# Patient Record
Sex: MALE | Race: WHITE | NOT HISPANIC OR LATINO
[De-identification: names, ages, dates, MRNs, and addresses within clinical notes are randomized per-mention and may not be internally consistent; named-entity substitution may affect disease eponyms.]

---

## 2017-03-18 ENCOUNTER — APPOINTMENT (OUTPATIENT)
Dept: ELECTROPHYSIOLOGY | Facility: CLINIC | Age: 57
End: 2017-03-18

## 2017-03-18 VITALS
BODY MASS INDEX: 31.81 KG/M2 | HEIGHT: 73 IN | WEIGHT: 240 LBS | OXYGEN SATURATION: 97 % | HEART RATE: 79 BPM | SYSTOLIC BLOOD PRESSURE: 155 MMHG | DIASTOLIC BLOOD PRESSURE: 87 MMHG

## 2017-06-09 ENCOUNTER — APPOINTMENT (OUTPATIENT)
Dept: ELECTROPHYSIOLOGY | Facility: CLINIC | Age: 57
End: 2017-06-09

## 2017-06-09 VITALS
SYSTOLIC BLOOD PRESSURE: 115 MMHG | OXYGEN SATURATION: 95 % | HEIGHT: 73 IN | HEART RATE: 68 BPM | DIASTOLIC BLOOD PRESSURE: 77 MMHG | WEIGHT: 230 LBS | BODY MASS INDEX: 30.48 KG/M2

## 2017-06-19 ENCOUNTER — APPOINTMENT (OUTPATIENT)
Dept: ELECTROPHYSIOLOGY | Facility: CLINIC | Age: 57
End: 2017-06-19

## 2017-07-26 ENCOUNTER — OUTPATIENT (OUTPATIENT)
Dept: OUTPATIENT SERVICES | Facility: HOSPITAL | Age: 57
LOS: 1 days | Discharge: HOME | End: 2017-07-26
Payer: MEDICARE

## 2017-07-26 ENCOUNTER — TRANSCRIPTION ENCOUNTER (OUTPATIENT)
Age: 57
End: 2017-07-26

## 2017-07-26 VITALS
TEMPERATURE: 98 F | DIASTOLIC BLOOD PRESSURE: 86 MMHG | SYSTOLIC BLOOD PRESSURE: 146 MMHG | RESPIRATION RATE: 16 BRPM | OXYGEN SATURATION: 98 % | HEART RATE: 656 BPM

## 2017-07-26 VITALS
SYSTOLIC BLOOD PRESSURE: 128 MMHG | DIASTOLIC BLOOD PRESSURE: 78 MMHG | OXYGEN SATURATION: 95 % | HEART RATE: 62 BPM | RESPIRATION RATE: 17 BRPM

## 2017-07-26 DIAGNOSIS — Z90.49 ACQUIRED ABSENCE OF OTHER SPECIFIED PARTS OF DIGESTIVE TRACT: Chronic | ICD-10-CM

## 2017-07-26 DIAGNOSIS — Z95.810 PRESENCE OF AUTOMATIC (IMPLANTABLE) CARDIAC DEFIBRILLATOR: Chronic | ICD-10-CM

## 2017-07-26 DIAGNOSIS — R07.1 CHEST PAIN ON BREATHING: ICD-10-CM

## 2017-07-26 LAB
ANION GAP SERPL CALC-SCNC: 11 MMOL/L — SIGNIFICANT CHANGE UP (ref 5–17)
APTT BLD: 33.1 SEC — SIGNIFICANT CHANGE UP (ref 27.5–37.4)
BUN SERPL-MCNC: 18 MG/DL — SIGNIFICANT CHANGE UP (ref 8–20)
CALCIUM SERPL-MCNC: 9.1 MG/DL — SIGNIFICANT CHANGE UP (ref 8.6–10.2)
CHLORIDE SERPL-SCNC: 102 MMOL/L — SIGNIFICANT CHANGE UP (ref 98–107)
CO2 SERPL-SCNC: 27 MMOL/L — SIGNIFICANT CHANGE UP (ref 22–29)
CREAT SERPL-MCNC: 0.97 MG/DL — SIGNIFICANT CHANGE UP (ref 0.5–1.3)
GLUCOSE SERPL-MCNC: 85 MG/DL — SIGNIFICANT CHANGE UP (ref 70–115)
HCT VFR BLD CALC: 38.7 % — LOW (ref 42–52)
HGB BLD-MCNC: 12.9 G/DL — LOW (ref 14–18)
INR BLD: 1.09 RATIO — SIGNIFICANT CHANGE UP (ref 0.88–1.16)
MCHC RBC-ENTMCNC: 20 PG — LOW (ref 27–31)
MCHC RBC-ENTMCNC: 33.3 G/DL — SIGNIFICANT CHANGE UP (ref 32–36)
MCV RBC AUTO: 60.1 FL — LOW (ref 80–94)
PLATELET # BLD AUTO: 129 K/UL — LOW (ref 150–400)
POTASSIUM SERPL-MCNC: 4.5 MMOL/L — SIGNIFICANT CHANGE UP (ref 3.5–5.3)
POTASSIUM SERPL-SCNC: 4.5 MMOL/L — SIGNIFICANT CHANGE UP (ref 3.5–5.3)
PROTHROM AB SERPL-ACNC: 12 SEC — SIGNIFICANT CHANGE UP (ref 9.8–12.7)
RBC # BLD: 6.44 M/UL — HIGH (ref 4.6–6.2)
RBC # FLD: 17.1 % — HIGH (ref 11–15.6)
SODIUM SERPL-SCNC: 140 MMOL/L — SIGNIFICANT CHANGE UP (ref 135–145)
WBC # BLD: 7.7 K/UL — SIGNIFICANT CHANGE UP (ref 4.8–10.8)
WBC # FLD AUTO: 7.7 K/UL — SIGNIFICANT CHANGE UP (ref 4.8–10.8)

## 2017-07-26 PROCEDURE — 36415 COLL VENOUS BLD VENIPUNCTURE: CPT

## 2017-07-26 PROCEDURE — 85027 COMPLETE CBC AUTOMATED: CPT

## 2017-07-26 PROCEDURE — 85610 PROTHROMBIN TIME: CPT

## 2017-07-26 PROCEDURE — C1769: CPT

## 2017-07-26 PROCEDURE — 85730 THROMBOPLASTIN TIME PARTIAL: CPT

## 2017-07-26 PROCEDURE — 33263 RMVL & RPLCMT DFB GEN 2 LEAD: CPT

## 2017-07-26 PROCEDURE — 93458 L HRT ARTERY/VENTRICLE ANGIO: CPT

## 2017-07-26 PROCEDURE — 80048 BASIC METABOLIC PNL TOTAL CA: CPT

## 2017-07-26 PROCEDURE — C1894: CPT

## 2017-07-26 PROCEDURE — C1887: CPT

## 2017-07-26 PROCEDURE — 93010 ELECTROCARDIOGRAM REPORT: CPT

## 2017-07-26 PROCEDURE — 93005 ELECTROCARDIOGRAM TRACING: CPT

## 2017-07-26 PROCEDURE — 93458 L HRT ARTERY/VENTRICLE ANGIO: CPT | Mod: 26

## 2017-07-26 RX ORDER — CEPHALEXIN 500 MG
1 CAPSULE ORAL
Qty: 0 | Refills: 0 | DISCHARGE
Start: 2017-07-26

## 2017-07-26 RX ORDER — COLCHICINE 0.6 MG
0.6 TABLET ORAL
Qty: 0 | Refills: 0 | Status: DISCONTINUED | OUTPATIENT
Start: 2017-07-26 | End: 2017-08-10

## 2017-07-26 RX ORDER — CEPHALEXIN 500 MG
500 CAPSULE ORAL ONCE
Qty: 0 | Refills: 0 | Status: COMPLETED | OUTPATIENT
Start: 2017-07-26 | End: 2017-07-26

## 2017-07-26 RX ORDER — COLCHICINE 0.6 MG
1 TABLET ORAL
Qty: 180 | Refills: 0 | OUTPATIENT
Start: 2017-07-26

## 2017-07-26 RX ADMIN — Medication 500 MILLIGRAM(S): at 21:03

## 2017-07-26 NOTE — DISCHARGE NOTE ADULT - CARE PLAN
Principal Discharge DX:	Pericarditis  Goal:	Optimal cardiac function  Instructions for follow-up, activity and diet:	Restricted use with no heavy lifting of affected arm for 48 hours.  No submerging the arm in water for 48 hours.    Call your doctor for any bleeding, swelling, loss of sensation in the hand or fingers, or fingers turning blue.  If heavy bleeding or large lumps form, hold pressure at the spot and come to the Emergency Room. Principal Discharge DX:	Pericarditis  Goal:	Optimal cardiac function  Instructions for follow-up, activity and diet:	Restricted use with no heavy lifting of affected arm for 48 hours.  No submerging the arm in water for 48 hours.    Call your doctor for any bleeding, swelling, loss of sensation in the hand or fingers, or fingers turning blue.  If heavy bleeding or large lumps form, hold pressure at the spot and come to the Emergency Room.  Instructions for follow-up, activity and diet:	Fever, chills, or redness in left chest wall site call doctor immediately.

## 2017-07-26 NOTE — DISCHARGE NOTE ADULT - HOSPITAL COURSE
S/P [facility] no intervention via right radial artery. Right wrist site benign. S/P [facility] no intervention via right radial artery. Right wrist site benign. S/P ICD generator change. Left chest wall benign. Patient awake and alert without complaints./ Denies chest pain, sob, palps    Echo 7.7.16 - LVEF 60-65%, mild dilatation of ascending aorta, trace MR (26 Jul 2017 14:43)    PAST MEDICAL & SURGICAL HISTORY:  Crohns disease  Pericarditis  Enlarged prostate  Ventricular tachycardia  Hypertension  Cardiomyopathy  Atrial fibrillation  Aortic aneurysm  Paroxysmal atrial fibrillation  Pacemaker  Mediterranean anemia  COPD (chronic obstructive pulmonary disease)  Bronchitis  Asthma  AICD (automatic cardioverter/defibrillator) present  History of cholecystectomy  History of appendectomy      Gen: Awake, alert, note acute distress  Neuro: A&OX3  Chest: CTA, S1, S2, no murmur, RRR, left chest site dry, intact, no edema  Abd: Soft  Extremity: No edema    PLAN:  No heavy lifting x 1 week  May shower tomorrow  Bedrest x 1 hour  Keflex 500mg po x 1 tonight  Hold eliquis until saturday  Discharge at 2100 if awake and alert   Follow up with Dr. Mariscal in 1 week for wound check S/P [facility] no intervention via right radial artery. Right wrist site benign. S/P ICD generator change. Left chest wall benign. Patient awake and alert without complaints./ Denies chest pain, sob, palps    Echo 7.7.16 - LVEF 60-65%, mild dilatation of ascending aorta, trace MR (26 Jul 2017 14:43)    PAST MEDICAL & SURGICAL HISTORY:  Crohns disease  Pericarditis  Enlarged prostate  Ventricular tachycardia  Hypertension  Cardiomyopathy  Atrial fibrillation  Aortic aneurysm  Paroxysmal atrial fibrillation  Pacemaker  Mediterranean anemia  COPD (chronic obstructive pulmonary disease)  Bronchitis  Asthma  AICD (automatic cardioverter/defibrillator) present  History of cholecystectomy  History of appendectomy      Gen: Awake, alert, note acute distress  Neuro: A&OX3  Chest: CTA, S1, S2, no murmur, RRR, left chest site dry, intact, no edema  Abd: Soft  Extremity: No edema    PLAN:  Wrist management discussed with patient  Signs and symptoms of infection discussed with patient at chest site  No heavy lifting x 1 week  May shower tomorrow  Bedrest x 1 hour  Keflex 500mg po x 1 tonight  Hold eliquis until saturday  Discharge at 2100 if awake and alert   Follow up with Dr. Mariscal in 1 week for wound check

## 2017-07-26 NOTE — DISCHARGE NOTE ADULT - CARE PROVIDERS DIRECT ADDRESSES
[email or web address]

## 2017-07-26 NOTE — H&P PST ADULT - HISTORY OF PRESENT ILLNESS
57 y/o male with c/p right sided dull chest pain with exertion associated with left arm pain over the past 1 week, that is relieved with rest. Pt has a history of pericarditis in the past and reports his recent symptoms are unfamiliar. Last cath was 8 years ago warranting no intervention at the time. PMH includes dilated cardiomyopathy s/p AICD implant, PAF, HTN, HLD 55 y/o male with c/p right sided dull chest pain with exertion associated with left arm pain over the past 1 week, that is relieved with rest. Pt has a history of pericarditis in the past and reports his recent symptoms are unfamiliar. Last cath was 8 years ago warranting no intervention at the time. PMH includes dilated cardiomyopathy s/p AICD implant, PAF s/p cryoablation 11/2016, HTN, HLD. 57 y/o male with c/o right sided dull chest pain with exertion associated with left arm pain over the past 1 week, that is relieved with rest. Pt has a history of pericarditis in the past and reports his recent symptoms are relatively unfamiliar. Last cath was 8 years ago warranting no intervention at the time. PMH includes dilated cardiomyopathy s/p AICD implant, PAF s/p cryoablation 11/2016, HTN, HLD.     Echo 7.7.16 - LVEF 60-65%, mild dilatation of ascending aorta, trace MR

## 2017-07-26 NOTE — ASU PATIENT PROFILE, ADULT - PSH
AICD (automatic cardioverter/defibrillator) present    History of appendectomy    History of cholecystectomy

## 2017-07-26 NOTE — ASU PATIENT PROFILE, ADULT - PMH
Aortic aneurysm    Asthma    Atrial fibrillation    Bronchitis    Cardiomyopathy    COPD (chronic obstructive pulmonary disease)    Crohns disease    Enlarged prostate    Hypertension    Mediterranean anemia    Pacemaker    Paroxysmal atrial fibrillation    Pericarditis    Ventricular tachycardia

## 2017-07-26 NOTE — DISCHARGE NOTE ADULT - PLAN OF CARE
Optimal cardiac function Restricted use with no heavy lifting of affected arm for 48 hours.  No submerging the arm in water for 48 hours.    Call your doctor for any bleeding, swelling, loss of sensation in the hand or fingers, or fingers turning blue.  If heavy bleeding or large lumps form, hold pressure at the spot and come to the Emergency Room. Fever, chills, or redness in left chest wall site call doctor immediately.

## 2017-07-26 NOTE — DISCHARGE NOTE ADULT - MEDICATION SUMMARY - MEDICATIONS TO TAKE
I will START or STAY ON the medications listed below when I get home from the hospital:    Pentasa  -- 1000 milligram(s) by mouth 4 times a day  -- Indication: For stomach    valsartan 160 mg oral capsule  --  by mouth 2 times a day  -- Indication: For bloodp ressure    Flomax 0.4 mg oral capsule  -- 1 cap(s) by mouth once a day  -- Indication: For Prostate    Eliquis 5 mg oral tablet  -- 1 tab(s) by mouth 2 times a day  -- Indication: For blood thinner; resume on saturday    Lipitor 10 mg oral tablet  -- 1 tab(s) by mouth once a day  -- Indication: For Cholesterol    Toprol-XL 25 mg oral tablet, extended release  --  by mouth 2 times a day  -- Indication: For blood pressure    Symbicort 160 mcg-4.5 mcg/inh inhalation aerosol  -- 2 puff(s) inhaled 2 times a day  -- Indication: For lungs    cephalexin 500 mg oral capsule  -- 1 cap(s) by mouth once  -- Indication: For anti biotic     hydroCHLOROthiazide 12.5 mg oral tablet  -- 1 tab(s) by mouth , As Needed  -- Indication: For diuretic    raNITIdine 150 mg oral capsule  --  by mouth once a day (at bedtime)  -- Indication: For stomac    montelukast 10 mg oral tablet  -- 1 tab(s) by mouth once a day  -- Indication: For lung    Lovaza 1000 mg oral capsule  -- 4 tab(s) by mouth once a day  -- Indication: For Cholesterol I will START or STAY ON the medications listed below when I get home from the hospital:    Pentasa  -- 1000 milligram(s) by mouth 4 times a day  -- Indication: For stomach    valsartan 160 mg oral capsule  --  by mouth 2 times a day  -- Indication: For bloodp ressure    Flomax 0.4 mg oral capsule  -- 1 cap(s) by mouth once a day  -- Indication: For Prostate    Eliquis 5 mg oral tablet  -- 1 tab(s) by mouth 2 times a day  -- Indication: For blood thinner; resume on saturday    colchicine 0.6 mg oral tablet  -- 1 tab(s) by mouth 2 times a day  -- Indication: For heart    Lipitor 10 mg oral tablet  -- 1 tab(s) by mouth once a day  -- Indication: For Cholesterol    Toprol-XL 25 mg oral tablet, extended release  --  by mouth 2 times a day  -- Indication: For blood pressure    Symbicort 160 mcg-4.5 mcg/inh inhalation aerosol  -- 2 puff(s) inhaled 2 times a day  -- Indication: For lungs    cephalexin 500 mg oral capsule  -- 1 cap(s) by mouth once  -- Indication: For anti biotic     hydroCHLOROthiazide 12.5 mg oral tablet  -- 1 tab(s) by mouth , As Needed  -- Indication: For diuretic    raNITIdine 150 mg oral capsule  --  by mouth once a day (at bedtime)  -- Indication: For stomac    montelukast 10 mg oral tablet  -- 1 tab(s) by mouth once a day  -- Indication: For lung    Lovaza 1000 mg oral capsule  -- 4 tab(s) by mouth once a day  -- Indication: For Cholesterol

## 2017-07-26 NOTE — DISCHARGE NOTE ADULT - PATIENT PORTAL LINK FT
“You can access the Follow[facility] Patient Portal, offered by [facility], by registering with the following website: [email or web address]”

## 2017-07-26 NOTE — DISCHARGE NOTE ADULT - NS AS ACTIVITY OBS
Walking-Outdoors allowed/Walking-Indoors allowed/Do not drive or operate machinery/No Heavy lifting/straining

## 2017-07-26 NOTE — DISCHARGE NOTE ADULT - ADDITIONAL INSTRUCTIONS
Follow up with Dr. Sands in 1 week for wound check Follow up with Dr. Mariscal in 1 week for wound check

## 2017-07-26 NOTE — H&P PST ADULT - ASSESSMENT
CCS 3   ·	[facility] today w/Dr Morton  ·	aspirin 81 mg x1    AICD at [facility]  ·	possible battery change today if no angioplasty performed today

## 2017-07-26 NOTE — DISCHARGE NOTE ADULT - CARE PROVIDER_API CALL
Wilver Sands), Cardiology; Internal Medicine  [address] [place] [id number]  Phone: [phone]  Fax: [phone] Claudio Mariscal (MD), Cardiac Electrophysiology; Cardiovascular Disease; Internal Medicine  [address] [place] [address]  Phone: [phone]  Fax: [phone]

## 2017-07-27 NOTE — CHART NOTE - NSCHARTNOTEFT_GEN_A_CORE
[address] [address]  Electrophysiology Lab  Dual Chamber Defibrillator Pulse Generator Change Report    Patient Information    Patient Name		Pietro Giles  Study Date		2017  Med Rec #		[id number]  			1960  Age			56yrs  Gender			Male  Referring		Adonis Hector MD  Electrophysiologist	Claudio Mariscal MD    Patient History  Pietro Giles has a Medtronic Dual Chamber ICD at [facility] and is here to undergo ICD Pulse Generator Change.    Indication:	Chronic Systolic Heart Failure (I50.22)    Procedure:  	Dual Chamber ICD Pulse Generator Change  Anesthesia:		1% Lidocaine and moderate sedation (see anesthesiologist’s note)  Methods:    	The patient was brought to the EP Lab in a fasting state.    Pre-op Antibiotics were administered (Ancef 2gm IV).  The left chest was prepped with chlorhexidine solution and draped in a sterile fashion.   The prior pocket was opened and with a blunt dissection and electrocautery the incision was opened to the level of the pulse generator capsule and the capsule was then opened and the prior existing pulse generator was removed from the capsule.  The leads were noted to be functioning adequately.  The pocket was irrigated with copious amounts of antibiotic solution and the leads were connected to the new pulse generator and the pulse generator was then placed in the pocket. The pocket was closed with a layer of 2.0 Vicryl followed by a running layer of 3.0 Vicryl followed by 4.0 Monocryl and then Dermabond to seal the skin.  The patient was brought to the recovery area in stable condition.  	  Device Information:        Pulse Generator –Medtronic  [id number] / Serial #[id number]       Leads - Right Atrial – Medtronic 5076-52cm / Serial #[id number]  (2005)                  Right Ventricular –  Medtronic – 6944-65cm /  Serial#[id number]  (2005)    Measured Data:      Right Atrial -  Threshold 0.75 V@0.4ms / Sensing 3.1 mV / Impedance 342 ohms      Right Ventricular -  Threshold  1.25V@0.4ms / 6.0 mV R waves / Impedance 589 ohms         Settings:       Bradycardia:   MVP      Tachycardia:  	   VF Zone 2400 bpm    Summary:                     Successful Dual Chamber ICD Pulse Generator Change      Recommendations:  •	Post op Antibiotic ordered    •	Follow-up wound and device check in 1-2 weeks             Claudio Mariscal MD, FHRS, [facility]   ABIM Certification in Cardiac EP / Cardiovascular Disease & Internal Medicine  [facility]

## 2017-07-30 RX ORDER — APIXABAN 2.5 MG/1
1 TABLET, FILM COATED ORAL
Qty: 0 | Refills: 0 | DISCHARGE
Start: 2017-07-30

## 2017-08-02 DIAGNOSIS — R07.89 OTHER CHEST PAIN: ICD-10-CM

## 2017-08-08 ENCOUNTER — APPOINTMENT (OUTPATIENT)
Dept: ELECTROPHYSIOLOGY | Facility: CLINIC | Age: 57
End: 2017-08-08
Payer: MEDICARE

## 2017-08-08 VITALS
HEIGHT: 73 IN | DIASTOLIC BLOOD PRESSURE: 84 MMHG | SYSTOLIC BLOOD PRESSURE: 162 MMHG | HEART RATE: 87 BPM | WEIGHT: 240 LBS | OXYGEN SATURATION: 94 % | BODY MASS INDEX: 31.81 KG/M2

## 2017-08-08 PROCEDURE — 93283 PRGRMG EVAL IMPLANTABLE DFB: CPT

## 2017-08-08 PROCEDURE — 99024 POSTOP FOLLOW-UP VISIT: CPT

## 2017-09-11 ENCOUNTER — APPOINTMENT (OUTPATIENT)
Dept: ELECTROPHYSIOLOGY | Facility: CLINIC | Age: 57
End: 2017-09-11

## 2017-11-14 ENCOUNTER — APPOINTMENT (OUTPATIENT)
Dept: ELECTROPHYSIOLOGY | Facility: CLINIC | Age: 57
End: 2017-11-14
Payer: MEDICARE

## 2017-11-14 VITALS
WEIGHT: 240 LBS | SYSTOLIC BLOOD PRESSURE: 128 MMHG | BODY MASS INDEX: 30.8 KG/M2 | HEIGHT: 74 IN | HEART RATE: 72 BPM | DIASTOLIC BLOOD PRESSURE: 88 MMHG

## 2017-11-14 PROCEDURE — 93283 PRGRMG EVAL IMPLANTABLE DFB: CPT

## 2017-11-14 PROCEDURE — 99213 OFFICE O/P EST LOW 20 MIN: CPT

## 2017-12-18 ENCOUNTER — RX RENEWAL (OUTPATIENT)
Age: 57
End: 2017-12-18

## 2018-01-22 NOTE — ASU PATIENT PROFILE, ADULT - TEACHING/LEARNING LEARNING PREFERENCES
RN NOTES

SEEN AND EVALUATED BY DR HARRIS WITH NEW ORDERS MADE. NOTED AND CARRIED OUT. individual instruction

## 2018-02-02 ENCOUNTER — MEDICATION RENEWAL (OUTPATIENT)
Age: 58
End: 2018-02-02

## 2018-03-02 ENCOUNTER — RX RENEWAL (OUTPATIENT)
Age: 58
End: 2018-03-02

## 2018-03-08 ENCOUNTER — MEDICATION RENEWAL (OUTPATIENT)
Age: 58
End: 2018-03-08

## 2018-03-12 ENCOUNTER — APPOINTMENT (OUTPATIENT)
Dept: ELECTROPHYSIOLOGY | Facility: CLINIC | Age: 58
End: 2018-03-12

## 2018-03-15 ENCOUNTER — RX RENEWAL (OUTPATIENT)
Age: 58
End: 2018-03-15

## 2018-04-02 ENCOUNTER — APPOINTMENT (OUTPATIENT)
Dept: ELECTROPHYSIOLOGY | Facility: CLINIC | Age: 58
End: 2018-04-02
Payer: MEDICARE

## 2018-04-02 PROCEDURE — 93294 REM INTERROG EVL PM/LDLS PM: CPT

## 2018-04-02 PROCEDURE — 93296 REM INTERROG EVL PM/IDS: CPT

## 2018-04-13 ENCOUNTER — MEDICATION RENEWAL (OUTPATIENT)
Age: 58
End: 2018-04-13

## 2018-04-13 ENCOUNTER — RECORD ABSTRACTING (OUTPATIENT)
Age: 58
End: 2018-04-13

## 2018-04-13 DIAGNOSIS — K21.9 GASTRO-ESOPHAGEAL REFLUX DISEASE W/OUT ESOPHAGITIS: ICD-10-CM

## 2018-04-13 RX ORDER — CEPHALEXIN 250 MG/1
250 CAPSULE ORAL
Qty: 14 | Refills: 0 | Status: COMPLETED | COMMUNITY
Start: 2017-04-03

## 2018-04-13 RX ORDER — NEOMYCIN SULFATE, POLYMYXIN B SULFATE AND DEXAMETHASONE 3.5; 10000; 1 MG/ML; [USP'U]/ML; MG/ML
3.5-10000-0.1 SUSPENSION OPHTHALMIC
Qty: 5 | Refills: 0 | Status: COMPLETED | COMMUNITY
Start: 2017-05-15

## 2018-04-16 ENCOUNTER — RX RENEWAL (OUTPATIENT)
Age: 58
End: 2018-04-16

## 2018-04-17 ENCOUNTER — APPOINTMENT (OUTPATIENT)
Dept: ELECTROPHYSIOLOGY | Facility: CLINIC | Age: 58
End: 2018-04-17
Payer: MEDICARE

## 2018-04-17 ENCOUNTER — RX RENEWAL (OUTPATIENT)
Age: 58
End: 2018-04-17

## 2018-04-17 ENCOUNTER — APPOINTMENT (OUTPATIENT)
Dept: CARDIOLOGY | Facility: CLINIC | Age: 58
End: 2018-04-17
Payer: MEDICARE

## 2018-04-17 ENCOUNTER — NON-APPOINTMENT (OUTPATIENT)
Age: 58
End: 2018-04-17

## 2018-04-17 VITALS
SYSTOLIC BLOOD PRESSURE: 144 MMHG | RESPIRATION RATE: 16 BRPM | DIASTOLIC BLOOD PRESSURE: 86 MMHG | BODY MASS INDEX: 30.8 KG/M2 | WEIGHT: 240 LBS | HEIGHT: 74 IN

## 2018-04-17 VITALS
BODY MASS INDEX: 30.8 KG/M2 | HEART RATE: 66 BPM | DIASTOLIC BLOOD PRESSURE: 86 MMHG | WEIGHT: 240 LBS | HEIGHT: 74 IN | SYSTOLIC BLOOD PRESSURE: 144 MMHG

## 2018-04-17 PROCEDURE — 93283 PRGRMG EVAL IMPLANTABLE DFB: CPT

## 2018-04-17 PROCEDURE — 99214 OFFICE O/P EST MOD 30 MIN: CPT

## 2018-04-27 ENCOUNTER — RX RENEWAL (OUTPATIENT)
Age: 58
End: 2018-04-27

## 2018-05-01 ENCOUNTER — RX RENEWAL (OUTPATIENT)
Age: 58
End: 2018-05-01

## 2018-05-02 ENCOUNTER — RX RENEWAL (OUTPATIENT)
Age: 58
End: 2018-05-02

## 2018-06-21 ENCOUNTER — MEDICATION RENEWAL (OUTPATIENT)
Age: 58
End: 2018-06-21

## 2018-07-20 ENCOUNTER — APPOINTMENT (OUTPATIENT)
Dept: ELECTROPHYSIOLOGY | Facility: CLINIC | Age: 58
End: 2018-07-20
Payer: MEDICARE

## 2018-07-20 VITALS
WEIGHT: 238 LBS | HEART RATE: 77 BPM | HEIGHT: 74 IN | DIASTOLIC BLOOD PRESSURE: 60 MMHG | BODY MASS INDEX: 30.54 KG/M2 | SYSTOLIC BLOOD PRESSURE: 94 MMHG

## 2018-07-20 PROCEDURE — 93283 PRGRMG EVAL IMPLANTABLE DFB: CPT

## 2018-07-20 PROCEDURE — 99214 OFFICE O/P EST MOD 30 MIN: CPT

## 2018-07-20 RX ORDER — COLCHICINE 0.6 MG/1
0.6 TABLET ORAL
Qty: 180 | Refills: 1 | Status: DISCONTINUED | COMMUNITY
Start: 2018-05-01 | End: 2018-07-20

## 2018-07-20 RX ORDER — VALSARTAN 160 MG/1
160 TABLET, COATED ORAL
Qty: 180 | Refills: 3 | Status: DISCONTINUED | COMMUNITY
Start: 2018-03-15 | End: 2018-07-20

## 2018-07-26 ENCOUNTER — APPOINTMENT (OUTPATIENT)
Dept: CARDIOLOGY | Facility: CLINIC | Age: 58
End: 2018-07-26
Payer: MEDICARE

## 2018-07-26 VITALS
SYSTOLIC BLOOD PRESSURE: 140 MMHG | HEIGHT: 74 IN | BODY MASS INDEX: 30.16 KG/M2 | DIASTOLIC BLOOD PRESSURE: 88 MMHG | HEART RATE: 69 BPM | WEIGHT: 235 LBS | RESPIRATION RATE: 14 BRPM

## 2018-07-26 PROCEDURE — 99214 OFFICE O/P EST MOD 30 MIN: CPT

## 2018-07-26 RX ORDER — LOSARTAN POTASSIUM 100 MG/1
100 TABLET, FILM COATED ORAL DAILY
Qty: 90 | Refills: 3 | Status: DISCONTINUED | COMMUNITY
Start: 2018-07-20 | End: 2018-07-26

## 2018-08-01 ENCOUNTER — APPOINTMENT (OUTPATIENT)
Dept: CARDIOLOGY | Facility: CLINIC | Age: 58
End: 2018-08-01
Payer: MEDICARE

## 2018-08-01 PROCEDURE — 93306 TTE W/DOPPLER COMPLETE: CPT

## 2018-08-17 ENCOUNTER — APPOINTMENT (OUTPATIENT)
Dept: CARDIOLOGY | Facility: CLINIC | Age: 58
End: 2018-08-17
Payer: MEDICARE

## 2018-08-17 VITALS
HEART RATE: 84 BPM | BODY MASS INDEX: 30.8 KG/M2 | DIASTOLIC BLOOD PRESSURE: 82 MMHG | RESPIRATION RATE: 14 BRPM | SYSTOLIC BLOOD PRESSURE: 139 MMHG | HEIGHT: 74 IN | WEIGHT: 240 LBS

## 2018-08-17 PROCEDURE — 99214 OFFICE O/P EST MOD 30 MIN: CPT

## 2018-08-17 PROCEDURE — 93000 ELECTROCARDIOGRAM COMPLETE: CPT

## 2018-08-24 ENCOUNTER — RX RENEWAL (OUTPATIENT)
Age: 58
End: 2018-08-24

## 2018-08-29 ENCOUNTER — APPOINTMENT (OUTPATIENT)
Dept: ELECTROPHYSIOLOGY | Facility: CLINIC | Age: 58
End: 2018-08-29
Payer: MEDICARE

## 2018-08-29 PROCEDURE — 93295 DEV INTERROG REMOTE 1/2/MLT: CPT

## 2018-08-29 PROCEDURE — 93296 REM INTERROG EVL PM/IDS: CPT

## 2018-10-08 ENCOUNTER — RX RENEWAL (OUTPATIENT)
Age: 58
End: 2018-10-08

## 2018-11-27 ENCOUNTER — RX RENEWAL (OUTPATIENT)
Age: 58
End: 2018-11-27

## 2018-11-27 ENCOUNTER — APPOINTMENT (OUTPATIENT)
Dept: CARDIOLOGY | Facility: CLINIC | Age: 58
End: 2018-11-27
Payer: MEDICARE

## 2018-11-27 VITALS
HEIGHT: 73 IN | SYSTOLIC BLOOD PRESSURE: 120 MMHG | HEART RATE: 79 BPM | WEIGHT: 221 LBS | RESPIRATION RATE: 14 BRPM | DIASTOLIC BLOOD PRESSURE: 80 MMHG | BODY MASS INDEX: 29.29 KG/M2

## 2018-11-27 PROCEDURE — 93000 ELECTROCARDIOGRAM COMPLETE: CPT

## 2018-11-27 PROCEDURE — 99214 OFFICE O/P EST MOD 30 MIN: CPT

## 2018-11-27 RX ORDER — AMLODIPINE BESYLATE 5 MG/1
5 TABLET ORAL DAILY
Qty: 90 | Refills: 3 | Status: DISCONTINUED | COMMUNITY
Start: 2018-04-17 | End: 2018-11-27

## 2018-11-27 NOTE — REVIEW OF SYSTEMS
[Recent Weight Loss (___ Lbs)] : recent [unfilled] ~Ulb weight loss [see HPI] : see HPI [Negative] : Heme/Lymph

## 2018-11-28 NOTE — REASON FOR VISIT
[FreeTextEntry1] : Mr. Giles presents today for the evaluation and management of his dilated cardiomyopathy, paroxysmal atrial fibrillation, hypertension, and hyperlipidemia.   \par   \par

## 2018-11-28 NOTE — ASSESSMENT
[FreeTextEntry1] : 1.  EKG today reveals sinus rhythm at 79 bpm.  Normal intervals.  No evidence of ischemia.  \par 2.  Hypertension:  Blood pressure running low on home blood pressure monitoring with systolic anywhere from 100-120, diastolics in the 60s.  At this time, I have advised the patient to discontinue Amlodipine given the low blood pressures and his recent complaints of orthostatic lightheadedness.  I suspect all of this is secondary to significant weight loss. \par 3.  Paroxysmal atrial fibrillation:  Patient remains in sinus rhythm at this time.  \par 4.  Dilated cardiomyopathy status-post AICD implantation:  Patient remains clinically stable denying chest pain or shortness of breath.  I have advised him to undergo AICD interrogation prior to traveling to [place] for the winter.  From a cardiac standpoint, we will continue all current medications.  If stable from a cardiac standpoint, office visit upon return. \par 5.  Hyperlipidemia:  Recent lipid profile reveals a total cholesterol of 123, HDL 37, LDL 74, triglycerides 58.  Patient advised to continue Atorvastatin therapy as well as follow a low-fat / low-cholesterol diet.    \par

## 2018-11-28 NOTE — HISTORY OF PRESENT ILLNESS
[FreeTextEntry1] : Mr. Giles presents today without complaints of exertional chest pain, shortness of breath, palpitations, or syncope.  He does admit to some lightheadedness particularly when standing up quickly.  This is likely orthostatic secondary to low blood pressure from recent significant weight loss (30 pounds).

## 2018-11-28 NOTE — PHYSICAL EXAM
[General Appearance - Well Developed] : well developed [General Appearance - In No Acute Distress] : no acute distress [Normal Conjunctiva] : the conjunctiva exhibited no abnormalities [Normal Oral Mucosa] : normal oral mucosa [Auscultation Breath Sounds / Voice Sounds] : lungs were clear to auscultation bilaterally [Heart Rate And Rhythm] : heart rate and rhythm were normal [Heart Sounds] : normal S1 and S2 [Bowel Sounds] : normal bowel sounds [Abnormal Walk] : normal gait [Skin Color & Pigmentation] : normal skin color and pigmentation [Skin Turgor] : normal skin turgor [Oriented To Time, Place, And Person] : oriented to person, place, and time [Affect] : the affect was normal [Mood] : the mood was normal [FreeTextEntry1] : No edema

## 2018-12-06 ENCOUNTER — APPOINTMENT (OUTPATIENT)
Dept: ELECTROPHYSIOLOGY | Facility: CLINIC | Age: 58
End: 2018-12-06

## 2018-12-11 ENCOUNTER — APPOINTMENT (OUTPATIENT)
Dept: ELECTROPHYSIOLOGY | Facility: CLINIC | Age: 58
End: 2018-12-11

## 2018-12-31 ENCOUNTER — RX RENEWAL (OUTPATIENT)
Age: 58
End: 2018-12-31

## 2019-01-03 ENCOUNTER — MEDICATION RENEWAL (OUTPATIENT)
Age: 59
End: 2019-01-03

## 2019-01-03 ENCOUNTER — RX RENEWAL (OUTPATIENT)
Age: 59
End: 2019-01-03

## 2019-01-04 ENCOUNTER — MEDICATION RENEWAL (OUTPATIENT)
Age: 59
End: 2019-01-04

## 2019-01-07 ENCOUNTER — RX RENEWAL (OUTPATIENT)
Age: 59
End: 2019-01-07

## 2019-01-12 ENCOUNTER — APPOINTMENT (OUTPATIENT)
Dept: ELECTROPHYSIOLOGY | Facility: CLINIC | Age: 59
End: 2019-01-12
Payer: MEDICARE

## 2019-01-12 PROCEDURE — 93295 DEV INTERROG REMOTE 1/2/MLT: CPT

## 2019-01-12 PROCEDURE — 93296 REM INTERROG EVL PM/IDS: CPT

## 2019-03-28 ENCOUNTER — MEDICATION RENEWAL (OUTPATIENT)
Age: 59
End: 2019-03-28

## 2019-03-29 ENCOUNTER — MEDICATION RENEWAL (OUTPATIENT)
Age: 59
End: 2019-03-29

## 2019-03-29 ENCOUNTER — RECORD ABSTRACTING (OUTPATIENT)
Age: 59
End: 2019-03-29

## 2019-04-13 ENCOUNTER — APPOINTMENT (OUTPATIENT)
Dept: ELECTROPHYSIOLOGY | Facility: CLINIC | Age: 59
End: 2019-04-13
Payer: MEDICARE

## 2019-04-13 PROCEDURE — 93296 REM INTERROG EVL PM/IDS: CPT

## 2019-04-13 PROCEDURE — 93295 DEV INTERROG REMOTE 1/2/MLT: CPT

## 2019-04-23 ENCOUNTER — NON-APPOINTMENT (OUTPATIENT)
Age: 59
End: 2019-04-23

## 2019-04-23 ENCOUNTER — APPOINTMENT (OUTPATIENT)
Dept: CARDIOLOGY | Facility: CLINIC | Age: 59
End: 2019-04-23
Payer: MEDICARE

## 2019-04-23 VITALS
WEIGHT: 239 LBS | SYSTOLIC BLOOD PRESSURE: 134 MMHG | DIASTOLIC BLOOD PRESSURE: 98 MMHG | HEIGHT: 73 IN | HEART RATE: 88 BPM | BODY MASS INDEX: 31.68 KG/M2 | RESPIRATION RATE: 14 BRPM

## 2019-04-23 PROCEDURE — 99214 OFFICE O/P EST MOD 30 MIN: CPT

## 2019-04-23 PROCEDURE — 93000 ELECTROCARDIOGRAM COMPLETE: CPT

## 2019-04-23 RX ORDER — HYDROCHLOROTHIAZIDE 12.5 MG/1
12.5 CAPSULE ORAL
Qty: 45 | Refills: 3 | Status: DISCONTINUED | COMMUNITY
End: 2019-04-23

## 2019-04-23 RX ORDER — IRBESARTAN 150 MG/1
150 TABLET ORAL TWICE DAILY
Qty: 180 | Refills: 3 | Status: DISCONTINUED | COMMUNITY
Start: 2018-07-26 | End: 2019-04-23

## 2019-04-29 NOTE — HISTORY OF PRESENT ILLNESS
[FreeTextEntry1] : From a cardiac standpoint, Mr. Giles denies exertional chest pain or shortness of breath.  He has developed labile hypertension and some palpitations.  He is scheduled to see Dr. Preston Vicente at [facility] for a second opinion regarding his underlying atrial fibrillation.

## 2019-04-29 NOTE — ASSESSMENT
[FreeTextEntry1] : 1.  EKG today demonstrates sinus rhythm at 88 bpm.  Normal intervals.  No evidence of ischemia. \par 2.  Hypertension:  Blood pressure suboptimally controlled at this time.  Patient recently switched from Irbesartan to Losartan.  Will ask patient to increase Metoprolol to 25 mg b.i.d. given recent palpitations.  A stricter low-salt diet and weight loss advised.  \par 3.  Atrial fibrillation:  Patient with occasional palpitations and in need of EP follow up.  Wishes to see Dr. Preston Vicente at [place].  He has an appointment with him tomorrow and will return to see me thereafter.  \par

## 2019-04-29 NOTE — ASSESSMENT
[FreeTextEntry1] : 1.  EKG today demonstrates sinus rhythm at 88 bpm.  Normal intervals.  No evidence of ischemia. \par 2.  Hypertension:  Blood pressure suboptimally controlled at this time.  Patient recently switched from Irbesartan to Losartan.  Will ask patient to increase Metoprolol to 25 mg b.i.d. given recent palpitations.  A stricter low-salt diet and weight loss advised.  \par 3.  Atrial fibrillation:  Patient with occasional palpitations and in need of EP follow up.  Wishes to see Dr. Preston Vicente at [facility].  He has an appointment with him tomorrow and will return to see me thereafter.  \par

## 2019-04-29 NOTE — REASON FOR VISIT
[FreeTextEntry1] : Mr. Giles is a pleasant 58-year-old white male with a past medical history significant for dilated cardiomyopathy, paroxysmal atrial fibrillation, hypertension, hyperlipidemia, who presents for follow up evaluation.

## 2019-04-29 NOTE — PHYSICAL EXAM
[General Appearance - Well Developed] : well developed [General Appearance - In No Acute Distress] : no acute distress [Normal Conjunctiva] : the conjunctiva exhibited no abnormalities [Normal Oral Mucosa] : normal oral mucosa [Auscultation Breath Sounds / Voice Sounds] : lungs were clear to auscultation bilaterally [Heart Rate And Rhythm] : heart rate and rhythm were normal [Heart Sounds] : normal S1 and S2 [Bowel Sounds] : normal bowel sounds [Abnormal Walk] : normal gait [Skin Turgor] : normal skin turgor [Skin Color & Pigmentation] : normal skin color and pigmentation [Affect] : the affect was normal [Oriented To Time, Place, And Person] : oriented to person, place, and time [Mood] : the mood was normal [FreeTextEntry1] : No edema

## 2019-04-29 NOTE — PHYSICAL EXAM
[General Appearance - Well Developed] : well developed [General Appearance - In No Acute Distress] : no acute distress [Normal Conjunctiva] : the conjunctiva exhibited no abnormalities [Normal Oral Mucosa] : normal oral mucosa [Auscultation Breath Sounds / Voice Sounds] : lungs were clear to auscultation bilaterally [Heart Rate And Rhythm] : heart rate and rhythm were normal [Heart Sounds] : normal S1 and S2 [Bowel Sounds] : normal bowel sounds [Abnormal Walk] : normal gait [Skin Color & Pigmentation] : normal skin color and pigmentation [Skin Turgor] : normal skin turgor [Affect] : the affect was normal [Oriented To Time, Place, And Person] : oriented to person, place, and time [Mood] : the mood was normal [FreeTextEntry1] : No edema

## 2019-04-29 NOTE — HISTORY OF PRESENT ILLNESS
[FreeTextEntry1] : From a cardiac standpoint, Mr. Giles denies exertional chest pain or shortness of breath.  He has developed labile hypertension and some palpitations.  He is scheduled to see Dr. Preston Vicente at [facility] for a second opinion regarding his underlying atrial fibrillation.

## 2019-05-08 ENCOUNTER — APPOINTMENT (OUTPATIENT)
Dept: CARDIOLOGY | Facility: CLINIC | Age: 59
End: 2019-05-08
Payer: MEDICARE

## 2019-05-08 PROCEDURE — 93306 TTE W/DOPPLER COMPLETE: CPT

## 2019-05-15 ENCOUNTER — APPOINTMENT (OUTPATIENT)
Dept: CARDIOLOGY | Facility: CLINIC | Age: 59
End: 2019-05-15
Payer: MEDICARE

## 2019-05-15 ENCOUNTER — OTHER (OUTPATIENT)
Age: 59
End: 2019-05-15

## 2019-05-15 PROCEDURE — A9500: CPT

## 2019-05-15 PROCEDURE — 78452 HT MUSCLE IMAGE SPECT MULT: CPT

## 2019-05-15 PROCEDURE — 93015 CV STRESS TEST SUPVJ I&R: CPT

## 2019-05-17 RX ORDER — KIT FOR THE PREPARATION OF TECHNETIUM TC99M SESTAMIBI 1 MG/5ML
INJECTION, POWDER, LYOPHILIZED, FOR SOLUTION PARENTERAL
Refills: 0 | Status: COMPLETED | OUTPATIENT
Start: 2019-05-17

## 2019-05-17 RX ADMIN — KIT FOR THE PREPARATION OF TECHNETIUM TC99M SESTAMIBI 0: 1 INJECTION, POWDER, LYOPHILIZED, FOR SOLUTION PARENTERAL at 00:00

## 2019-05-20 ENCOUNTER — RX RENEWAL (OUTPATIENT)
Age: 59
End: 2019-05-20

## 2019-05-21 ENCOUNTER — NON-APPOINTMENT (OUTPATIENT)
Age: 59
End: 2019-05-21

## 2019-05-21 ENCOUNTER — APPOINTMENT (OUTPATIENT)
Dept: CARDIOLOGY | Facility: CLINIC | Age: 59
End: 2019-05-21
Payer: MEDICARE

## 2019-05-21 VITALS
BODY MASS INDEX: 31.81 KG/M2 | WEIGHT: 240 LBS | HEART RATE: 85 BPM | RESPIRATION RATE: 14 BRPM | HEIGHT: 73 IN | DIASTOLIC BLOOD PRESSURE: 98 MMHG | SYSTOLIC BLOOD PRESSURE: 144 MMHG

## 2019-05-21 PROCEDURE — 99214 OFFICE O/P EST MOD 30 MIN: CPT

## 2019-05-21 PROCEDURE — 93000 ELECTROCARDIOGRAM COMPLETE: CPT

## 2019-05-21 NOTE — REASON FOR VISIT
[FreeTextEntry1] : THe patient is a 58 y.o. white male who presents today for evaluation and management of dilated cardiomyopathy, paroxysmal atrial fibrillation, COPD/bronchitis, hypertension and hypercholesterolemia.

## 2019-05-21 NOTE — PHYSICAL EXAM
[General Appearance - Well Developed] : well developed [General Appearance - In No Acute Distress] : no acute distress [Normal Conjunctiva] : the conjunctiva exhibited no abnormalities [Normal Oral Mucosa] : normal oral mucosa [Heart Rate And Rhythm] : heart rate and rhythm were normal [Heart Sounds] : normal S1 and S2 [Murmurs] : no murmurs present [Edema] : no peripheral edema present [Bowel Sounds] : normal bowel sounds [Abnormal Walk] : normal gait [Skin Color & Pigmentation] : normal skin color and pigmentation [Skin Turgor] : normal skin turgor [Oriented To Time, Place, And Person] : oriented to person, place, and time [Affect] : the affect was normal [Mood] : the mood was normal [FreeTextEntry1] : No edema

## 2019-05-21 NOTE — DISCUSSION/SUMMARY
[FreeTextEntry1] : Case and plan discussed with Dr. Hector.\par \par 1 - Hypertension:  Blood pressure on the higher side today, as he is on steroids for his bronchitis.  At this time we have asked him to take an extra dose of metoprolol succinate a day while on the prednisone.  He is to continue to follow a low sodium diet.  Home blood pressure monitor was brought in for calibration.  Blood presure registered 20 points higher with his home machine.\par \par 2 - Atrial fibrillation:  Today's EKG reveals sinus rhythm.  Recently seen by Dr. Vicente who will continue to monitor his device.  CHADsVASc = 1 Dr. Vicente has chosen to hold anticoagulation at this time.\par \par Echocardiogram (5/8/19):\par - Mildly increased left ventricular internal cavity size.\par - Normal global left ventricular systolic function.\par - Abnormal septal motion\par - EF 55-60%\par - Mild concentric left ventricular hypertrophy.\par - Mildly dilated left atrium\par - Calculated LA volume index 41 ml/ml2\par - Mildly dilated right atrium.\par - Mildly enlarged right ventricle\par - There is mild dilatation of the ascending aorta.\par - Normal aortic valve, without any evidence of aortic stenosis of insufficiency.\par - Mild thickening of the anterior and posterior mitral valve leaflets.\par - Trace mitral valve regurgitation.\par - Mildly elevated pulmonary artery  systolic pressure.\par - Interatrial and interventricular septa appear intace, with no echocardiographic evidence of intracardiac shunting.\par - There is no evidence of pericardial effusion.\par \par Nuclear stress test (5/15/19)\par - FIndings consistent with a mild dilated non-ischemic cardiomyopathy.\par - Normal Sestanibi myocardial perfusion SPECT imaging.\par -The left ventricle is mildly dilated.\par - EF was mildly reduced = 45%\par - The EKG was negative for ischemia.\par - The patient developed occasional PVCs and PACs during exercise.\par - The [employer] Treadmill Scoer was 10, consistent with low risk.\par - When compared to prior study dated 2016, there has been no significant interval change.\par \par 3 - Recent labs 4/24/19:  cholesterol 138, HDL 30, LDL 85, triglycerides 114, glucose 86, HgA1c 5.7, TST 0.977, H/H 13.1/36.6, platelets 171, BUN 14, creatinine 1.06, potassium 4.5, Vitamin D 24.8, Magnesium 2.1.\par \par 4 - Follow up in 3 months with Dr. Hector.

## 2019-05-21 NOTE — HISTORY OF PRESENT ILLNESS
[FreeTextEntry1] : Mr. Giles presents today for follow up evaluation and results of his echocardiogram and nuclear stress test.  Denies complaints of chest pain, palpitations, lightheadedness or syncope.  Presently with bronchitis and is taking prednisone and Z-Keegan.

## 2019-06-20 ENCOUNTER — RX RENEWAL (OUTPATIENT)
Age: 59
End: 2019-06-20

## 2019-06-24 ENCOUNTER — RX RENEWAL (OUTPATIENT)
Age: 59
End: 2019-06-24

## 2019-08-02 ENCOUNTER — RX RENEWAL (OUTPATIENT)
Age: 59
End: 2019-08-02

## 2019-08-06 ENCOUNTER — NON-APPOINTMENT (OUTPATIENT)
Age: 59
End: 2019-08-06

## 2019-08-06 ENCOUNTER — APPOINTMENT (OUTPATIENT)
Dept: CARDIOLOGY | Facility: CLINIC | Age: 59
End: 2019-08-06
Payer: MEDICARE

## 2019-08-06 VITALS
DIASTOLIC BLOOD PRESSURE: 90 MMHG | WEIGHT: 235 LBS | SYSTOLIC BLOOD PRESSURE: 136 MMHG | HEIGHT: 74 IN | RESPIRATION RATE: 16 BRPM | HEART RATE: 62 BPM | BODY MASS INDEX: 30.16 KG/M2

## 2019-08-06 PROCEDURE — 93000 ELECTROCARDIOGRAM COMPLETE: CPT

## 2019-08-06 PROCEDURE — 99214 OFFICE O/P EST MOD 30 MIN: CPT

## 2019-08-06 NOTE — REASON FOR VISIT
[FreeTextEntry1] : THe patient is a 58 y.o. white male who presents today for evaluation and management of dilated cardiomyopathy, paroxysmal atrial fibrillation, COPD/bronchitis, hypertension and hypercholesterolemia.  Mr. Giles is here regarding cardiac clearance scheduled sometime this August for routine Endoscopy / Colonoscopy with Dr. Carrera at [facility].  The patient reports feeling overall well and without cardiac complaints.  He denies CP, SOB, MEDRANO, PND, orthopnea, palpitations, presyncope, syncope, edema.

## 2019-08-06 NOTE — ASSESSMENT
[FreeTextEntry1] : EKG (8/6/2019):  The EKG illustrates electronic atrial pacemaker, rate of 62, no significant ST-T wave changes. \par \par Most recent Transthoracic Echocardiogram (5/8/19): Mildly increased left ventricular internal cavity size, normal global left ventricular systolic function with an EF of 55 to 60%, abnormal septal motion, mild concentric left ventricular hypertrophy, mildly dilated left atrium, mildly dilated right atrium, mildly enlarged right ventricle, there is mild dilatation of the ascending aorta (4.2 cm), trace mitral valve regurgitation, mildly elevated pulmonary artery systolic pressure, there is no evidence of pericardial effusion.\par \par Most recent Nuclear stress test (5/15/19):  FIndings consistent with a mild dilated non-ischemic cardiomyopathy, normal Sestanibi myocardial perfusion SPECT imaging, the EKG was negative for ischemia.  When compared to prior study dated 2016, there has been no significant interval change.\par \par Most recent Blood Work (4/24/19): cholesterol 138, HDL 30, LDL 85, triglycerides 114, glucose 86, HgA1c 5.7, TST 0.977, H/H 13.1/36.6, platelets 171, BUN 14, creatinine 1.06, potassium 4.5, Vitamin D 24.8, Magnesium 2.1.

## 2019-08-06 NOTE — HISTORY OF PRESENT ILLNESS
[FreeTextEntry1] : Patient is tolerating cardiac medications without negative side effects including Atorvastatin 10 mg QHS, HCTZ 12.5 mg QD, Losartan 50 mg QD, Metoprolol Succinate 25 mg BID.\par \par He reports his at-home blood pressures have been averaging in the 120s to 130s over 70s to 80s.

## 2019-08-06 NOTE — DISCUSSION/SUMMARY
[FreeTextEntry1] : 1).  Based upon Mr. Pietro Giles's otherwise stable cardiac pattern and most recent cardiovascular testing demonstrating no significant abnormalities, there is no absolute cardiac contraindication for him to undergo the proposed Endoscopy / Colonoscopy procedures.  Recommend cardiac precautions and perioperative cardiac monitoring until stable.  \par \par 2).  Patient will follow up with our office on August 28th or PRN.\par \par If I can be of additional assistance, please do not hesitate to call.

## 2019-08-06 NOTE — PHYSICAL EXAM
[Normal Appearance] : normal appearance [General Appearance - In No Acute Distress] : no acute distress [FreeTextEntry1] : Obese [Normal Conjunctiva] : the conjunctiva exhibited no abnormalities [Normal Oral Mucosa] : normal oral mucosa [Normal Oropharynx] : normal oropharynx [Normal Jugular Venous A Waves Present] : normal jugular venous A waves present [Normal Jugular Venous V Waves Present] : normal jugular venous V waves present [No Jugular Venous Morelos A Waves] : no jugular venous morelos A waves [] : no respiratory distress [Respiration, Rhythm And Depth] : normal respiratory rhythm and effort [Auscultation Breath Sounds / Voice Sounds] : lungs were clear to auscultation bilaterally [Heart Rate And Rhythm] : heart rate and rhythm were normal [Heart Sounds] : normal S1 and S2 [Murmurs] : no murmurs present [Edema] : no peripheral edema present [Bowel Sounds] : normal bowel sounds [Abnormal Walk] : normal gait [Nail Clubbing] : no clubbing of the fingernails [Cyanosis, Localized] : no localized cyanosis [Skin Color & Pigmentation] : normal skin color and pigmentation [Oriented To Time, Place, And Person] : oriented to person, place, and time [Impaired Insight] : insight and judgment were intact [Affect] : the affect was normal

## 2019-08-30 ENCOUNTER — RX RENEWAL (OUTPATIENT)
Age: 59
End: 2019-08-30

## 2019-09-28 ENCOUNTER — CLINICAL ADVICE (OUTPATIENT)
Age: 59
End: 2019-09-28

## 2019-11-14 ENCOUNTER — APPOINTMENT (OUTPATIENT)
Dept: CARDIOLOGY | Facility: CLINIC | Age: 59
End: 2019-11-14
Payer: MEDICARE

## 2019-11-14 VITALS
RESPIRATION RATE: 16 BRPM | HEIGHT: 74 IN | DIASTOLIC BLOOD PRESSURE: 84 MMHG | BODY MASS INDEX: 28.36 KG/M2 | HEART RATE: 84 BPM | SYSTOLIC BLOOD PRESSURE: 128 MMHG | WEIGHT: 221 LBS

## 2019-11-14 PROCEDURE — 93000 ELECTROCARDIOGRAM COMPLETE: CPT

## 2019-11-14 PROCEDURE — 99214 OFFICE O/P EST MOD 30 MIN: CPT

## 2019-11-14 RX ORDER — LOSARTAN POTASSIUM 50 MG/1
50 TABLET, FILM COATED ORAL TWICE DAILY
Qty: 180 | Refills: 1 | Status: DISCONTINUED | COMMUNITY
Start: 2019-03-29 | End: 2019-11-14

## 2019-11-14 RX ORDER — TAMSULOSIN HYDROCHLORIDE 0.4 MG/1
0.4 CAPSULE ORAL DAILY
Refills: 0 | Status: DISCONTINUED | COMMUNITY
End: 2019-11-14

## 2019-11-15 ENCOUNTER — MEDICATION RENEWAL (OUTPATIENT)
Age: 59
End: 2019-11-15

## 2019-11-15 NOTE — PHYSICAL EXAM
[General Appearance - Well Developed] : well developed [General Appearance - In No Acute Distress] : no acute distress [Normal Oral Mucosa] : normal oral mucosa [Normal Conjunctiva] : the conjunctiva exhibited no abnormalities [Auscultation Breath Sounds / Voice Sounds] : lungs were clear to auscultation bilaterally [Heart Sounds] : normal S1 and S2 [Heart Rate And Rhythm] : heart rate and rhythm were normal [Abnormal Walk] : normal gait [Bowel Sounds] : normal bowel sounds [Skin Turgor] : normal skin turgor [Skin Color & Pigmentation] : normal skin color and pigmentation [Oriented To Time, Place, And Person] : oriented to person, place, and time [Mood] : the mood was normal [Affect] : the affect was normal [FreeTextEntry1] : No edema. Varicose veins.

## 2019-11-15 NOTE — ASSESSMENT
[FreeTextEntry1] :  1.  EKG today reveals normal sinus rhythm at 84 bpm.  Normal intervals.  No evidence of ischemia.  2.  Dilated cardiomyopathy status-post AICD implantation:  Patient clinically stable denying chest pain or shortness of breath.  Remains physically active.  Will need device assessment at some point in the near future.   3.  Paroxysmal atrial fibrillation:  Patient remains in sinus rhythm at this time.  Tolerating current medications.   4.  Hypertension:  Blood pressure well controlled following reduction in recent dose of Losartan.  Home blood pressures appear quite good at this time.  5.  Hyperlipidemia:  Patient advised on a low-fat / low-cholesterol diet.  Will undergo fasting lipid profile prior to returning to [place] after Christmas.  6.  Lower extremity varicosities:  Patient wishes a vascular opinion at this point.  Will refer to Dr. Solorzano.  Follow up office visit here in six weeks prior to his migration to [place].  7.  May reduced HCTZ to an every-other-day schedule if lightheadedness returns.

## 2019-11-15 NOTE — REASON FOR VISIT
[FreeTextEntry1] : Mr. Giles is a pleasant 58-year-old white male with a past medical history significant for dilated cardiomyopathy status-post AICD implantation as well as paroxysmal atrial fibrillation, hypertension, hyperlipidemia, and reactive airway disease who presents for follow up evaluation.  \par \par \par

## 2019-11-15 NOTE — HISTORY OF PRESENT ILLNESS
[FreeTextEntry1] : From a cardiac standpoint, Mr. Giles remains clinically stable denying chest pain, shortness of breath, or other cardiac symptoms.  He states he has developed some varicosities involving his lower extremities.

## 2019-12-02 ENCOUNTER — APPOINTMENT (OUTPATIENT)
Dept: VASCULAR SURGERY | Facility: CLINIC | Age: 59
End: 2019-12-02
Payer: MEDICARE

## 2019-12-02 DIAGNOSIS — I87.2 VENOUS INSUFFICIENCY (CHRONIC) (PERIPHERAL): ICD-10-CM

## 2019-12-02 DIAGNOSIS — I83.90 ASYMPTOMATIC VARICOSE VEINS OF UNSPECIFIED LOWER EXTREMITY: ICD-10-CM

## 2019-12-02 PROCEDURE — 93970 EXTREMITY STUDY: CPT

## 2019-12-02 PROCEDURE — 99203 OFFICE O/P NEW LOW 30 MIN: CPT

## 2019-12-17 ENCOUNTER — APPOINTMENT (OUTPATIENT)
Dept: CARDIOLOGY | Facility: CLINIC | Age: 59
End: 2019-12-17

## 2019-12-19 ENCOUNTER — APPOINTMENT (OUTPATIENT)
Dept: CARDIOLOGY | Facility: CLINIC | Age: 59
End: 2019-12-19
Payer: MEDICARE

## 2019-12-19 VITALS
DIASTOLIC BLOOD PRESSURE: 79 MMHG | HEIGHT: 74 IN | SYSTOLIC BLOOD PRESSURE: 129 MMHG | HEART RATE: 69 BPM | BODY MASS INDEX: 27.85 KG/M2 | WEIGHT: 217 LBS | RESPIRATION RATE: 16 BRPM

## 2019-12-19 PROCEDURE — 93000 ELECTROCARDIOGRAM COMPLETE: CPT

## 2019-12-19 PROCEDURE — 99214 OFFICE O/P EST MOD 30 MIN: CPT

## 2019-12-19 RX ORDER — HYDROCHLOROTHIAZIDE 12.5 MG/1
12.5 CAPSULE ORAL
Qty: 90 | Refills: 3 | Status: DISCONTINUED | COMMUNITY
Start: 1900-01-01 | End: 2019-12-19

## 2019-12-19 NOTE — REVIEW OF SYSTEMS
[Recent Weight Loss (___ Lbs)] : recent [unfilled] ~Ulb weight loss [see HPI] : see HPI [Negative] : Psychiatric

## 2019-12-20 NOTE — PHYSICAL EXAM
[General Appearance - Well Developed] : well developed [General Appearance - In No Acute Distress] : no acute distress [Normal Conjunctiva] : the conjunctiva exhibited no abnormalities [Normal Oral Mucosa] : normal oral mucosa [Heart Rate And Rhythm] : heart rate and rhythm were normal [Auscultation Breath Sounds / Voice Sounds] : lungs were clear to auscultation bilaterally [Heart Sounds] : normal S1 and S2 [Bowel Sounds] : normal bowel sounds [Abnormal Walk] : normal gait [Skin Color & Pigmentation] : normal skin color and pigmentation [Affect] : the affect was normal [Oriented To Time, Place, And Person] : oriented to person, place, and time [Skin Turgor] : normal skin turgor [Mood] : the mood was normal [FreeTextEntry1] : No edema. Varicose veins.

## 2019-12-20 NOTE — REASON FOR VISIT
[FreeTextEntry1] : Mr. Giles is a pleasant 59-year-old white male with a past medical history significant for dilated cardiomyopathy for which he is status-post AICD implantation as well as paroxysmal atrial fibrillation, hypertension, hyperlipidemia, and reactive airway disease, who presents for follow up evaluation.  \par \par \par

## 2019-12-20 NOTE — ASSESSMENT
[FreeTextEntry1] :  1.  EKG today reveals normal sinus rhythm at 69 bpm.  Normal intervals.  No evidence of ischemia.  2.  Dilated cardiomyopathy status-post AICD implantation:  Patient clinically stable without chest pain, shortness of breath, or other issues at this time.   3.  Lightheadedness:  This may well have been orthostatic secondary to vigorous exercise training and possibly dehydration.  I have asked patient to discontinue his every-other-day HCTZ dose as well as switch his Losartan from 50 mg q.d. to 25 mg b.i.d. and if still lightheaded to 25 mg q.d.   4.  Patient states he is eating four eggs per day along with whole wheat toast.  His recent lipid profile revealed a total cholesterol of 130, HDL 45, LDL 77, triglycerides 39.  Patient advised to cut back on his egg intake to possibly two eggs per day, however, he feels that he “will go hungry” if he does that.    He does not have coronary artery disease of any significance to date.  Will continue to monitor.   5.  Paroxysmal atrial fibrillation:  Patient with periodic device checks which do not demonstrate evidence of recurrent a-fib flutter.  Patient will be traveling to [place] in the next few weeks where he will spend the winter given his history of reactive airway disease.  Will have follow up echocardiography and an office visit in six months.

## 2019-12-20 NOTE — HISTORY OF PRESENT ILLNESS
[FreeTextEntry1] : From a cardiac standpoint, Mr. Giles denies exertional chest pain, shortness of breath, or palpitations.  He states he is exercising five times a week at a local gym and has lost some 30 pounds in the process.  He states he is eating four eggs per day along with whole wheat toast and “some other carbs.”  He experienced some orthostatic lightheadedness which may well be related to vigorous exercise and possibly dehydration.  Blood pressure medication adjustment may also be required.

## 2020-04-07 ENCOUNTER — APPOINTMENT (OUTPATIENT)
Dept: CARDIOLOGY | Facility: CLINIC | Age: 60
End: 2020-04-07
Payer: MEDICARE

## 2020-04-07 PROCEDURE — 99214 OFFICE O/P EST MOD 30 MIN: CPT | Mod: 95

## 2020-04-07 RX ORDER — DOXYCYCLINE HYCLATE 100 MG/1
100 CAPSULE ORAL
Qty: 14 | Refills: 0 | Status: COMPLETED | COMMUNITY
Start: 2020-01-27

## 2020-04-07 RX ORDER — BENZONATATE 100 MG/1
100 CAPSULE ORAL
Qty: 30 | Refills: 0 | Status: COMPLETED | COMMUNITY
Start: 2020-01-27

## 2020-04-07 RX ORDER — OXYCODONE AND ACETAMINOPHEN 5; 325 MG/1; MG/1
5-325 TABLET ORAL
Qty: 6 | Refills: 0 | Status: COMPLETED | COMMUNITY
Start: 2019-12-17

## 2020-04-07 RX ORDER — METOPROLOL SUCCINATE 25 MG/1
25 TABLET, EXTENDED RELEASE ORAL
Qty: 180 | Refills: 3 | Status: DISCONTINUED | COMMUNITY
End: 2020-04-07

## 2020-04-07 RX ORDER — AZITHROMYCIN 250 MG/1
250 TABLET, FILM COATED ORAL
Qty: 6 | Refills: 0 | Status: COMPLETED | COMMUNITY
Start: 2020-04-02

## 2020-04-07 RX ORDER — MESALAMINE 500 MG/1
500 CAPSULE ORAL 4 TIMES DAILY
Refills: 0 | Status: ACTIVE | COMMUNITY
Start: 1900-01-01 | End: 1900-01-01

## 2020-04-07 RX ORDER — SILODOSIN 8 MG/1
8 CAPSULE ORAL
Refills: 0 | Status: DISCONTINUED | COMMUNITY
End: 2020-04-07

## 2020-04-07 RX ORDER — METHYLPREDNISOLONE 4 MG/1
4 TABLET ORAL
Qty: 21 | Refills: 0 | Status: COMPLETED | COMMUNITY
Start: 2020-04-02

## 2020-04-07 RX ORDER — CHLORHEXIDINE GLUCONATE, 0.12% ORAL RINSE 1.2 MG/ML
0.12 SOLUTION DENTAL
Qty: 473 | Refills: 0 | Status: COMPLETED | COMMUNITY
Start: 2019-12-17

## 2020-04-07 RX ORDER — MECLIZINE HYDROCHLORIDE 25 MG/1
25 TABLET ORAL
Qty: 90 | Refills: 0 | Status: COMPLETED | COMMUNITY
Start: 2020-03-17

## 2020-04-07 NOTE — PHYSICAL EXAM
[General Appearance - Well Developed] : well developed [General Appearance - Well Nourished] : well nourished [General Appearance - In No Acute Distress] : no acute distress [Oriented To Time, Place, And Person] : oriented to person, place, and time [Affect] : the affect was normal [Mood] : the mood was normal

## 2020-04-07 NOTE — REVIEW OF SYSTEMS
[Recent Weight Loss (___ Lbs)] : recent [unfilled] ~Ulb weight loss [Palpitations] : palpitations [see HPI] : see HPI [Negative] : Heme/Lymph

## 2020-04-08 NOTE — ASSESSMENT
[FreeTextEntry1] : \par 1.  Paroxysmal atrial fibrillation:  Patient in atrial fibrillation x5 days.  Began Eliquis almost immediately, currently on 5 mg b.i.d.  Today was advised to begin Amiodarone at 400 mg daily for the next seven days.  Metoprolol to be reduced to 25 mg daily.  Patient also advised to lower or discontinue Losartan therapy if he is lightheaded when standing up.  Renewal for Eliquis sent to local pharmacy in [place].  Will call patient back in three days for a status update.  Have scheduled a telemedicine follow up for Monday, April 13th at 9:00 a.m.  Can speak to patient sooner if necessary.  \par \par 2.  Hypertension:  A low-salt diet strictly advised.  Patient to watch his salt intake.  \par \par 3.  Hyperlipidemia:  Patient to follow a low-fat / low-cholesterol diet.  Will likely undergo fasting bloodwork in approximately two weeks.  Will send script to [facility] at a number provided by the patient.  \par

## 2020-04-08 NOTE — HISTORY OF PRESENT ILLNESS
[Home] : at home, [unfilled] , at the time of the visit. [Medical Office: ([facility])___] : at ~his/her~ medical office located in [place] [FreeTextEntry1] :  Patient states that approximately five days ago, went into atrial fibrillation which was verified by Dr. Vicente at [place] via transtelephonic monitoring of his AICD.  Patient advised to begin Amiodarone at 400 mg daily for seven days, then 200 mg daily thereafter.  He elected to wait until this morning.  He began Amiodarone today and thus far appears to be tolerating it well.  He had been on steroids up until yesterday for his reactive airway disease.  Patient was also advised to reduce his Metoprolol dose from 25 mg b.i.d. to 25 mg daily which he has done.

## 2020-04-08 NOTE — REASON FOR VISIT
[FreeTextEntry1] : Mr. Giles is a delightful 59-year-old white male with a past medical history significant for dilated cardiomyopathy status-post AICD implantation as well as paroxysmal atrial fibrillation, hypertension, hyperlipidemia, and reactive airway disease, whom is currently living in [place].  \par \par

## 2020-04-13 ENCOUNTER — APPOINTMENT (OUTPATIENT)
Dept: CARDIOLOGY | Facility: CLINIC | Age: 60
End: 2020-04-13
Payer: MEDICARE

## 2020-04-13 PROCEDURE — 99214 OFFICE O/P EST MOD 30 MIN: CPT | Mod: 95

## 2020-04-15 NOTE — ASSESSMENT
[FreeTextEntry1] : \par 1.  Dilated cardiomyopathy status-post AICD implantation:  Patient remains clinically stable at this point without further symptoms.   He feels much better now that he has converted back to sinus rhythm. \par \par 2.  Paroxysmal atrial fibrillation:  Patient advised to continue Amiodarone at 200 mg daily as well as low-dose Metoprolol succinate.  \par \par 3.  Hypertension:  Patient advised on a low-salt diet and weight loss.  \par \par 4.  Hyperlipidemia:  Patient was provided with a script for bloodwork which he will obtain in the next three weeks.  Plan to reevaluate in four weeks via telemedicine. \par

## 2020-04-15 NOTE — HISTORY OF PRESENT ILLNESS
[Home] : at home, [unfilled] , at the time of the visit. [Medical Office: ([facility])___] : at ~his/her~ medical office located in [place] [Patient] : the patient [FreeTextEntry1] :  The patient states that today he feels “much better” as his rhythm as returned to normal while taking an Amiodarone load of 400 mg b.i.d.  Currently on 200 mg daily.  Patient without chest pain, shortness of breath, lightheadedness, or syncope.

## 2020-04-15 NOTE — REASON FOR VISIT
[FreeTextEntry1] : Mr. Giles is a delightful 59-year-old white male with a past medical history significant for dilated cardiomyopathy status-post AICD implantation as well as paroxysmal atrial fibrillation with recent recurrence of his arrhythmia, who presents today for follow up telemedicine evaluation. \par

## 2020-04-15 NOTE — PHYSICAL EXAM
[General Appearance - Well Developed] : well developed [General Appearance - Well Nourished] : well nourished [General Appearance - In No Acute Distress] : no acute distress [Normal Conjunctiva] : the conjunctiva exhibited no abnormalities [Oriented To Time, Place, And Person] : oriented to person, place, and time [Affect] : the affect was normal [Mood] : the mood was normal [FreeTextEntry1] : S4 gallop

## 2020-04-22 PROBLEM — I83.90 VARICOSE VEINS: Status: ACTIVE | Noted: 2019-11-14

## 2020-04-22 PROBLEM — I87.2 VENOUS INSUFFICIENCY: Status: ACTIVE | Noted: 2020-04-22

## 2020-05-01 DIAGNOSIS — E87.5 HYPERKALEMIA: ICD-10-CM

## 2020-05-04 ENCOUNTER — RX RENEWAL (OUTPATIENT)
Age: 60
End: 2020-05-04

## 2020-06-05 ENCOUNTER — APPOINTMENT (OUTPATIENT)
Dept: CARDIOLOGY | Facility: CLINIC | Age: 60
End: 2020-06-05
Payer: MEDICARE

## 2020-06-05 VITALS
WEIGHT: 226 LBS | DIASTOLIC BLOOD PRESSURE: 86 MMHG | HEIGHT: 74 IN | BODY MASS INDEX: 29 KG/M2 | SYSTOLIC BLOOD PRESSURE: 110 MMHG

## 2020-06-05 PROCEDURE — 99214 OFFICE O/P EST MOD 30 MIN: CPT | Mod: 95

## 2020-06-05 RX ORDER — LOSARTAN POTASSIUM 25 MG/1
25 TABLET, FILM COATED ORAL
Qty: 90 | Refills: 3 | Status: DISCONTINUED | COMMUNITY
End: 2020-06-05

## 2020-06-05 RX ORDER — AMIODARONE HYDROCHLORIDE 200 MG/1
200 TABLET ORAL
Qty: 90 | Refills: 0 | Status: DISCONTINUED | COMMUNITY
Start: 2020-04-06 | End: 2020-06-05

## 2020-06-08 NOTE — REASON FOR VISIT
[FreeTextEntry1] : Mr. Giles is a delightful 59-year-old white male with a past medical history significant for dilated cardiomyopathy status-post AICD implantation as well as paroxysmal atrial fibrillation with recent recurrence of his arrhythmia as well as reactive airway disease/asthma, who presents for follow up evaluation. \par \par

## 2020-06-08 NOTE — HISTORY OF PRESENT ILLNESS
[Home] : at home, [unfilled] , at the time of the visit. [Medical Office: ([facility])___] : at the medical office located in  [FreeTextEntry1] : Patient requested a TELEHEALTH contact at this time to discuss specific cardiovascular issues and associated risk factors. This contact took place via TELEHEALTH link utilizing [employer] Touchpoint software. Consent was obtained from the patient in advance of this communication. The consent form can be found in the "OTHER CONSENTS" section of the patient's medical record. Time spent on this communication was approximately: 25 minutes\par \par Mr. Giles was recently admitted to [facility] with complaints of chest pain.  He underwent a cardiac evaluation which included a nuclear stress test which was unremarkable for ischemia or prior infarction.  Left ventricular ejection fraction estimated at 51%.  Patient subsequently discharged and presents now with no significant complaints.  \par

## 2020-06-08 NOTE — ASSESSMENT
[FreeTextEntry1] : \par 1.  Dilated cardiomyopathy status-post AICD implantation:  Patient clinically stable denying any further chest pain or shortness of breath.  Tolerating medications well. \par \par 2.  Paroxysmal atrial fibrillation:  Patient remains in sinus rhythm at this time and feels well.  \par \par 3.  Asthma/reactive airway disease:  Had a lengthy discussion with the patient regarding whether or not to return home given current conditions here in [place].  I have advised him to wait another month and decide after the Fourth of July as to whether or not it makes sense to come home.  If clinically stable, will touch base with him before that.  \par

## 2020-06-15 ENCOUNTER — APPOINTMENT (RX ONLY)
Dept: URBAN - METROPOLITAN AREA CLINIC 129 | Facility: CLINIC | Age: 60
Setting detail: DERMATOLOGY
End: 2020-06-15

## 2020-06-15 DIAGNOSIS — L81.4 OTHER MELANIN HYPERPIGMENTATION: ICD-10-CM

## 2020-06-15 DIAGNOSIS — L82.0 INFLAMED SEBORRHEIC KERATOSIS: ICD-10-CM

## 2020-06-15 DIAGNOSIS — D18.0 HEMANGIOMA: ICD-10-CM

## 2020-06-15 DIAGNOSIS — Z71.89 OTHER SPECIFIED COUNSELING: ICD-10-CM

## 2020-06-15 DIAGNOSIS — L82.1 OTHER SEBORRHEIC KERATOSIS: ICD-10-CM

## 2020-06-15 DIAGNOSIS — D22 MELANOCYTIC NEVI: ICD-10-CM

## 2020-06-15 PROBLEM — D22.5 MELANOCYTIC NEVI OF TRUNK: Status: ACTIVE | Noted: 2020-06-15

## 2020-06-15 PROBLEM — D18.01 HEMANGIOMA OF SKIN AND SUBCUTANEOUS TISSUE: Status: ACTIVE | Noted: 2020-06-15

## 2020-06-15 PROCEDURE — ? LIQUID NITROGEN

## 2020-06-15 PROCEDURE — ? COUNSELING

## 2020-06-15 PROCEDURE — 99203 OFFICE O/P NEW LOW 30 MIN: CPT | Mod: 25

## 2020-06-15 PROCEDURE — 17110 DESTRUCTION B9 LES UP TO 14: CPT

## 2020-06-15 ASSESSMENT — LOCATION DETAILED DESCRIPTION DERM
LOCATION DETAILED: LEFT CENTRAL FRONTAL SCALP
LOCATION DETAILED: RIGHT MEDIAL SUPERIOR CHEST
LOCATION DETAILED: SUPERIOR LUMBAR SPINE
LOCATION DETAILED: INFERIOR THORACIC SPINE
LOCATION DETAILED: SUPERIOR THORACIC SPINE

## 2020-06-15 ASSESSMENT — LOCATION SIMPLE DESCRIPTION DERM
LOCATION SIMPLE: CHEST
LOCATION SIMPLE: UPPER BACK
LOCATION SIMPLE: LEFT SCALP
LOCATION SIMPLE: LOWER BACK

## 2020-06-15 ASSESSMENT — LOCATION ZONE DERM
LOCATION ZONE: SCALP
LOCATION ZONE: TRUNK

## 2020-06-15 NOTE — PROCEDURE: LIQUID NITROGEN
Add 52 Modifier (Optional): no
Detail Level: Detailed
Consent: The patient's consent was obtained including but not limited to risks of crusting, scabbing, blistering, scarring, darker or lighter pigmentary change, recurrence, incomplete removal and infection.
Number Of Freeze-Thaw Cycles: 3 freeze-thaw cycles
Medical Necessity Clause: This procedure was medically necessary because the lesions that were treated were:
Post-Care Instructions: I reviewed with the patient in detail post-care instructions. Patient is to wear sunprotection, and avoid picking at any of the treated lesions. Pt may apply Vaseline to crusted or scabbing areas.
Medical Necessity Information: It is in your best interest to select a reason for this procedure from the list below. All of these items fulfill various CMS LCD requirements except the new and changing color options.

## 2020-07-09 ENCOUNTER — APPOINTMENT (OUTPATIENT)
Dept: CARDIOLOGY | Facility: CLINIC | Age: 60
End: 2020-07-09
Payer: MEDICARE

## 2020-07-09 PROCEDURE — 99214 OFFICE O/P EST MOD 30 MIN: CPT | Mod: 95

## 2020-07-09 NOTE — PHYSICAL EXAM
[General Appearance - Well Developed] : well developed [General Appearance - Well Nourished] : well nourished [General Appearance - In No Acute Distress] : no acute distress [Normal Conjunctiva] : the conjunctiva exhibited no abnormalities [Skin Color & Pigmentation] : normal skin color and pigmentation [Oriented To Time, Place, And Person] : oriented to person, place, and time [Affect] : the affect was normal [Mood] : the mood was normal

## 2020-07-09 NOTE — REVIEW OF SYSTEMS
[see HPI] : see HPI [Recent Weight Loss (___ Lbs)] : recent [unfilled] ~Ulb weight loss [Negative] : Endocrine

## 2020-07-10 NOTE — REASON FOR VISIT
[FreeTextEntry1] : Mr. Giles is a delightful 59-year-old white male with a past medical history significant for dilated cardiomyopathy status-post AICD implantation as well as paroxysmal atrial fibrillation with recent recurrence of arrhythmia as well as reactive airway disease/asthma, who presents for telehealth evaluation. \par \par

## 2020-07-10 NOTE — ASSESSMENT
[FreeTextEntry1] : 1.  Hypertension:  Blood pressure well controlled at this time on current medications.  A low-salt diet is advised. \par \par 2.  Hyperlipidemia:  Patient advised to undergo fasting bloodwork in the near future. \par \par 3.  Dilated/non-ischemic cardiomyopathy:  Patient without chest pain or shortness of breath.  \par \par 4.  Paroxysmal atrial fibrillation:  Patient remains in sinus rhythm at this time and feels well. \par \par 5.  Reactive airway disease/asthma:  Patient advised to walk as much as possible and follow up with pulmonologist.  \par \par 6.  Patient to undergo echocardiography next month with office visits thereafter. \par \par  \par

## 2020-07-10 NOTE — HISTORY OF PRESENT ILLNESS
[Home] : at home, [unfilled] , at the time of the visit. [Medical Office: ([facility])___] : at the medical office located in  [FreeTextEntry1] : Patient requested a TELEHEALTH contact at this time to discuss specific cardiovascular issues and associated risk factors. This contact took place via TELEHEALTH link utilizing [employer] Touchpoint software. Consent was obtained from the patient in advance of this communication. The consent form can be found in the "OTHER CONSENTS" section of the patient's medical record. Time spent on this communication was approximately: 25 minutes\par \par From a cardiac standpoint, Mr. Giles denies exertional chest pain, shortness of breath, palpitations, lightheadedness, or syncope.

## 2020-08-04 ENCOUNTER — RX RENEWAL (OUTPATIENT)
Age: 60
End: 2020-08-04

## 2020-08-11 ENCOUNTER — APPOINTMENT (OUTPATIENT)
Dept: CARDIOLOGY | Facility: CLINIC | Age: 60
End: 2020-08-11
Payer: MEDICARE

## 2020-08-11 PROCEDURE — 93306 TTE W/DOPPLER COMPLETE: CPT

## 2020-08-18 ENCOUNTER — APPOINTMENT (OUTPATIENT)
Dept: CARDIOLOGY | Facility: CLINIC | Age: 60
End: 2020-08-18
Payer: MEDICARE

## 2020-08-18 VITALS
DIASTOLIC BLOOD PRESSURE: 92 MMHG | SYSTOLIC BLOOD PRESSURE: 138 MMHG | RESPIRATION RATE: 16 BRPM | HEART RATE: 70 BPM | HEIGHT: 74 IN | TEMPERATURE: 98.4 F | WEIGHT: 235 LBS | BODY MASS INDEX: 30.16 KG/M2

## 2020-08-18 PROCEDURE — 99214 OFFICE O/P EST MOD 30 MIN: CPT

## 2020-08-18 PROCEDURE — 93000 ELECTROCARDIOGRAM COMPLETE: CPT

## 2020-08-18 RX ORDER — BUDESONIDE AND FORMOTEROL FUMARATE DIHYDRATE 160; 4.5 UG/1; UG/1
AEROSOL RESPIRATORY (INHALATION)
Refills: 0 | Status: DISCONTINUED | COMMUNITY
End: 2020-08-18

## 2020-08-18 RX ORDER — RANITIDINE 150 MG/1
150 TABLET ORAL AT BEDTIME
Refills: 0 | Status: DISCONTINUED | COMMUNITY
End: 2020-08-18

## 2020-08-19 NOTE — ASSESSMENT
[FreeTextEntry1] : \par 1.  EKG today reveals normal sinus rhythm at 70 bpm.  Normal intervals.  No evidence of ischemia. \par \par 2.  Dilated cardiomyopathy:  Clinically stable at this time without symptoms and physically active.  Patient’s AICD being followed by Dr. Preston Vicente at [place]. \par \par 3.  BPH in need of Rezum procedure:  No major contraindications to this procedure under any form of anesthesia.  The patient will require discontinuation of current Eliquis therapy for four doses (two days) as well as temporary deactivation of the defibrillatory component of his AICD.  He will take all of his other medications.  Resumption of Eliquis as per urology.  If clinically stable from a cardiac standpoint, follow up office visit three months. \par

## 2020-08-19 NOTE — REASON FOR VISIT
[FreeTextEntry1] : Mr. Giles is a delightful 59-year-old white male with a past medical history significant for dilated cardiomyopathy for which he is status-post AICD implantation as well as paroxysmal atrial fibrillation and reactive airway disease/asthma, who presents for follow up evaluation. \par \par

## 2020-08-19 NOTE — HISTORY OF PRESENT ILLNESS
[FreeTextEntry1] : \par From a cardiac standpoint, Mr. Giles remains clinically stable denying exertional chest pain, shortness of breath, palpitations, lightheadedness, or syncope.  He recently returned from [place] and is currently contemplating undergoing a Rezum procedure for his underlying BPH.

## 2020-08-19 NOTE — PHYSICAL EXAM
[General Appearance - Well Developed] : well developed [General Appearance - Well Nourished] : well nourished [General Appearance - In No Acute Distress] : no acute distress [Normal Conjunctiva] : the conjunctiva exhibited no abnormalities [Normal Oral Mucosa] : normal oral mucosa [Auscultation Breath Sounds / Voice Sounds] : lungs were clear to auscultation bilaterally [Heart Rate And Rhythm] : heart rate and rhythm were normal [Heart Sounds] : normal S1 and S2 [Bowel Sounds] : normal bowel sounds [Skin Color & Pigmentation] : normal skin color and pigmentation [Abnormal Walk] : normal gait [Oriented To Time, Place, And Person] : oriented to person, place, and time [Skin Turgor] : normal skin turgor [Affect] : the affect was normal [Mood] : the mood was normal [FreeTextEntry1] : No edema

## 2020-11-10 ENCOUNTER — APPOINTMENT (OUTPATIENT)
Dept: CARDIOLOGY | Facility: CLINIC | Age: 60
End: 2020-11-10
Payer: MEDICARE

## 2020-11-10 VITALS
RESPIRATION RATE: 16 BRPM | WEIGHT: 240 LBS | HEIGHT: 74 IN | BODY MASS INDEX: 30.8 KG/M2 | TEMPERATURE: 98.1 F | SYSTOLIC BLOOD PRESSURE: 130 MMHG | HEART RATE: 65 BPM | DIASTOLIC BLOOD PRESSURE: 86 MMHG

## 2020-11-10 PROCEDURE — 93000 ELECTROCARDIOGRAM COMPLETE: CPT

## 2020-11-10 PROCEDURE — 99214 OFFICE O/P EST MOD 30 MIN: CPT

## 2020-11-12 NOTE — REASON FOR VISIT
[FreeTextEntry1] : Mr. Giles is a delightful, 59-year-old, white male with a past medical history significant for dilated cardiomyopathy for which he is s/p AICD implantation as well as paroxysmal atrial fibrillation and reactive airway disease / asthma, who presents for follow-up evaluation.

## 2020-11-12 NOTE — HISTORY OF PRESENT ILLNESS
[FreeTextEntry1] : From a cardiac standpoint, Mr. Giles remains clinically stable denying chest pain, shortness of breath, palpitations, lightheadedness or syncope.  He remains reasonably active and is planning to return to [place] in January.

## 2020-11-12 NOTE — ASSESSMENT
[FreeTextEntry1] : 1.  EKG today demonstrates normal sinus rhythm at 65 bpm.  LVH voltage.  Normal intervals.  No evidence of ischemia.  \par \par 2.  Dilated nonischemic cardiomyopathy s/p AICD implantation:  Clinically stable denying chest pain, shortness of breath, or palpitations.  His AICD is currently being followed by Dr. Preston Vicente.  \par \par 3.  Paroxysmal atrial fibrillation.  The patient is without palpitations and in sinus rhythm.  Tolerating Eliquis therapy well.  \par \par 4.  Reactive airway disease / asthma.  The patient is without significant symptoms at this time.  Will head down to [place] before the winter weather sets in, in order to prevent recurrence. \par \par 5.  Hyperlipidemia:  Review of recent lipid profile demonstrates a total cholesterol of 119, HDL 31, LDL 73, triglycerides 74.  The patient is advised to continue a low fat / low-cholesterol diet and exercise on a regular basis.  We will see him again in three months prior to his departure to [place].

## 2020-12-01 ENCOUNTER — APPOINTMENT (OUTPATIENT)
Dept: CARDIOLOGY | Facility: CLINIC | Age: 60
End: 2020-12-01
Payer: MEDICARE

## 2020-12-01 PROCEDURE — 93978 VASCULAR STUDY: CPT

## 2020-12-10 ENCOUNTER — APPOINTMENT (OUTPATIENT)
Dept: CARDIOLOGY | Facility: CLINIC | Age: 60
End: 2020-12-10
Payer: MEDICARE

## 2020-12-10 VITALS
RESPIRATION RATE: 16 BRPM | DIASTOLIC BLOOD PRESSURE: 70 MMHG | HEART RATE: 75 BPM | WEIGHT: 235 LBS | TEMPERATURE: 97.5 F | SYSTOLIC BLOOD PRESSURE: 122 MMHG | HEIGHT: 74 IN | BODY MASS INDEX: 30.16 KG/M2

## 2020-12-10 PROCEDURE — 93000 ELECTROCARDIOGRAM COMPLETE: CPT

## 2020-12-10 PROCEDURE — 99214 OFFICE O/P EST MOD 30 MIN: CPT

## 2020-12-18 NOTE — ASSESSMENT
[FreeTextEntry1] : 1.  EKG today reveals sinus rhythm at 75 bpm.  Normal intervals.  Borderline LVH voltage.  No acute ischemic changes. \par \par 2.  Dilated cardiomyopathy status-post AICD implantation:  Clinically stable at this time without significant symptoms.  Patient followed by Dr. Preston Vicente at [place]. \par \par 3.  Atrial fibrillation status-post ablation:  Patient with sporadic recurrences.  Remains on Eliquis.  If these become more prominent, will likely require repeat ablation.  \par \par 4.  Reactive airway disease/asthma:  Patient advised to spend the rest of the winter in [place] where his symptoms are minimal regarding this condition.  Follow up office visit upon his return in 3-6 months.  \par

## 2020-12-18 NOTE — HISTORY OF PRESENT ILLNESS
[FreeTextEntry1] :  From a cardiac standpoint, Mr. Giles remains clinically stable denying chest pain, shortness of breath, or other cardiac symptoms.

## 2020-12-18 NOTE — REASON FOR VISIT
[FreeTextEntry1] : Mr. Giles is a delightful 59-year-old white male with a past medical history significant for dilated cardiomyopathy for which he is status-post AICD implantation as well as paroxysmal atrial fibrillation and reactive airway disease/asthma who presents for follow up evaluation. \par \par

## 2021-01-22 ENCOUNTER — RX RENEWAL (OUTPATIENT)
Age: 61
End: 2021-01-22

## 2021-03-11 ENCOUNTER — APPOINTMENT (RX ONLY)
Dept: URBAN - METROPOLITAN AREA CLINIC 124 | Facility: CLINIC | Age: 61
Setting detail: DERMATOLOGY
End: 2021-03-11

## 2021-03-11 DIAGNOSIS — D18.0 HEMANGIOMA: ICD-10-CM

## 2021-03-11 DIAGNOSIS — D485 NEOPLASM OF UNCERTAIN BEHAVIOR OF SKIN: ICD-10-CM

## 2021-03-11 DIAGNOSIS — Z71.89 OTHER SPECIFIED COUNSELING: ICD-10-CM

## 2021-03-11 DIAGNOSIS — L82.1 OTHER SEBORRHEIC KERATOSIS: ICD-10-CM

## 2021-03-11 DIAGNOSIS — D22 MELANOCYTIC NEVI: ICD-10-CM

## 2021-03-11 DIAGNOSIS — L82.0 INFLAMED SEBORRHEIC KERATOSIS: ICD-10-CM

## 2021-03-11 DIAGNOSIS — L81.4 OTHER MELANIN HYPERPIGMENTATION: ICD-10-CM

## 2021-03-11 PROBLEM — D48.5 NEOPLASM OF UNCERTAIN BEHAVIOR OF SKIN: Status: ACTIVE | Noted: 2021-03-11

## 2021-03-11 PROBLEM — D18.01 HEMANGIOMA OF SKIN AND SUBCUTANEOUS TISSUE: Status: ACTIVE | Noted: 2021-03-11

## 2021-03-11 PROBLEM — D22.5 MELANOCYTIC NEVI OF TRUNK: Status: ACTIVE | Noted: 2021-03-11

## 2021-03-11 PROCEDURE — ? COUNSELING

## 2021-03-11 PROCEDURE — ? LIQUID NITROGEN

## 2021-03-11 PROCEDURE — 17110 DESTRUCTION B9 LES UP TO 14: CPT

## 2021-03-11 PROCEDURE — 11102 TANGNTL BX SKIN SINGLE LES: CPT | Mod: 59

## 2021-03-11 PROCEDURE — 99213 OFFICE O/P EST LOW 20 MIN: CPT | Mod: 25

## 2021-03-11 PROCEDURE — ? BIOPSY BY SHAVE METHOD

## 2021-03-11 ASSESSMENT — LOCATION DETAILED DESCRIPTION DERM
LOCATION DETAILED: RIGHT MEDIAL SUPERIOR CHEST
LOCATION DETAILED: SUPERIOR THORACIC SPINE
LOCATION DETAILED: LEFT CENTRAL FRONTAL SCALP
LOCATION DETAILED: SUPERIOR LUMBAR SPINE
LOCATION DETAILED: RIGHT CENTRAL FRONTAL SCALP
LOCATION DETAILED: INFERIOR THORACIC SPINE
LOCATION DETAILED: RIGHT SCROTUM

## 2021-03-11 ASSESSMENT — LOCATION SIMPLE DESCRIPTION DERM
LOCATION SIMPLE: RIGHT SCALP
LOCATION SIMPLE: SCROTUM
LOCATION SIMPLE: CHEST
LOCATION SIMPLE: LEFT SCALP
LOCATION SIMPLE: LOWER BACK
LOCATION SIMPLE: UPPER BACK

## 2021-03-11 ASSESSMENT — LOCATION ZONE DERM
LOCATION ZONE: GENITALIA
LOCATION ZONE: TRUNK
LOCATION ZONE: SCALP

## 2021-03-11 NOTE — PROCEDURE: BIOPSY BY SHAVE METHOD
Body Location Override (Optional - Billing Will Still Be Based On Selected Body Map Location If Applicable): right frontal scalp
Detail Level: Detailed
Depth Of Biopsy: dermis
Was A Bandage Applied: Yes
Size Of Lesion In Cm: 0.5
X Size Of Lesion In Cm: 0
Biopsy Type: H and E
Biopsy Method: Dermablade
Anesthesia Type: 1% lidocaine with epinephrine
Hemostasis: Aluminum Chloride
Wound Care: Vaseline
Dressing: bandage
Destruction After The Procedure: No
Type Of Destruction Used: Electrodesiccation and Curettage
Curettage Text: The wound bed was treated with curettage after the biopsy was performed.
Cryotherapy Text: The wound bed was treated with cryotherapy after the biopsy was performed.
Electrodesiccation Text: The wound bed was treated with electrodesiccation after the biopsy was performed.
Electrodesiccation And Curettage Text: The wound bed was treated with electrodesiccation and curettage after the biopsy was performed.
Silver Nitrate Text: The wound bed was treated with silver nitrate after the biopsy was performed.
Lab: [facility]
Lab Facility: [address]
Consent: The provider's intent is to obtain a tissue sample solely for diagnostic purposes. Written consent to obtain tissue sample was obtained and risks were reviewed including but not limited to scarring, infection, bleeding, scabbing, incomplete removal, nerve damage and allergy to anesthesia.
Post-Care Instructions: I reviewed with the patient in detail post-care instructions. Patient is to keep the biopsy site dry overnight, and then apply bacitracin twice daily until healed. Patient may apply hydrogen peroxide soaks to remove any crusting.
Notification Instructions: Patient will be notified of biopsy results. However, patient instructed to call the office if not contacted within 2 weeks.
Billing Type: [employer]
Information: Selecting Yes will display possible errors in your note based on the variables you have selected. This validation is only offered as a suggestion for you. PLEASE NOTE THAT THE VALIDATION TEXT WILL BE REMOVED WHEN YOU FINALIZE YOUR NOTE. IF YOU WANT TO FAX A PRELIMINARY NOTE YOU WILL NEED TO TOGGLE THIS TO 'NO' IF YOU DO NOT WANT IT IN YOUR FAXED NOTE.

## 2021-05-25 ENCOUNTER — APPOINTMENT (OUTPATIENT)
Dept: CARDIOLOGY | Facility: CLINIC | Age: 61
End: 2021-05-25
Payer: MEDICARE

## 2021-05-25 VITALS
DIASTOLIC BLOOD PRESSURE: 80 MMHG | HEART RATE: 82 BPM | BODY MASS INDEX: 30.16 KG/M2 | WEIGHT: 235 LBS | TEMPERATURE: 98.5 F | SYSTOLIC BLOOD PRESSURE: 150 MMHG | HEIGHT: 74 IN | RESPIRATION RATE: 16 BRPM

## 2021-05-25 PROCEDURE — 93000 ELECTROCARDIOGRAM COMPLETE: CPT

## 2021-05-25 PROCEDURE — 99214 OFFICE O/P EST MOD 30 MIN: CPT

## 2021-05-25 RX ORDER — LOSARTAN POTASSIUM 25 MG/1
25 TABLET, FILM COATED ORAL
Qty: 180 | Refills: 3 | Status: DISCONTINUED | COMMUNITY
End: 2021-05-25

## 2021-05-27 NOTE — H&P PST ADULT - TOBACCO USE
Former smoker Bcc Histology Text: The dermis contains distinctive tumor cell masses of various shapes and sizes composed of cells with large oval or elongated nuclei with relatively little cytoplasm.  The nuclei are homogenous.  There is no pronounced variation in size or intensity of staining and no significant anaplastic appearance.  The cells at the outer aspect of the tumor masses show palisading of nuclei.  Tumor masses are surrounded by a connective tissue stroma and in some areas there is retraction artifact of the tumor nodule away from the surrounding stroma.

## 2021-05-27 NOTE — DISCUSSION/SUMMARY
[FreeTextEntry1] : 1.  EKG today reveals normal sinus rhythm at 82 bpm.  Normal intervals.  No evidence of ischemia.  \par \par 2.  Hypertension:  Blood pressure here in the office today 150/100.  His machine here today records 150/105.  Blood pressures at home running anywhere from 120-150 systolic and 70-90 diastolic.  \par \par 3.  I have had a lengthy conversation with Mr. Giles regarding his current caffeine consumption and I have advised him to decrease that to one cup of caffeinated beverages per day.  The rest should all be decaf.  A strict low-salt diet was discussed as well.  Weight loss was discussed.  Patient apparently exercising regularly without symptoms.  Will also increase Metoprolol from 25 mg q.d. to 25 mg b.i.d. as well as Losartan from 50 mg q.d. to 50 mg b.i.d.  Follow up blood pressure check in approximately three weeks. \par \par 4.  Review of recent lipid profile demonstrates a total cholesterol of 136, HDL 34, LDL 84, triglycerides 96.  Patient advised to continue Atorvastatin 10 mg daily.  There is a minimal elevation of ASD and ALT to 46 each.  Will monitor this closely and repeat bloodwork within the next month or two.       \par

## 2021-05-27 NOTE — REASON FOR VISIT
[FreeTextEntry1] : Mr. Giles is a delightful 60-year-old white male with a past medical history significant for non-ischemic dilated cardiomyopathy (normal coronary arteries on cardiac cath 2017) for which he is status-post AICD implantation as well as paroxysmal atrial fibrillation status-post ablation and reactive airway disease/asthma, who presents for follow up evaluation.  \par \par \par

## 2021-05-27 NOTE — HISTORY OF PRESENT ILLNESS
[FreeTextEntry1] : From a cardiac standpoint, Mr. Giles denies exertional chest pain, shortness of breath, or palpitations.  He has noted that his blood pressure has been elevated recently as has been his heart rate.  He admits to drinking at least 2-3 cups of caffeinated coffee per day.

## 2021-06-04 ENCOUNTER — APPOINTMENT (OUTPATIENT)
Dept: CARDIOLOGY | Facility: CLINIC | Age: 61
End: 2021-06-04
Payer: MEDICARE

## 2021-06-04 PROCEDURE — 93306 TTE W/DOPPLER COMPLETE: CPT

## 2021-06-16 ENCOUNTER — NON-APPOINTMENT (OUTPATIENT)
Age: 61
End: 2021-06-16

## 2021-06-16 ENCOUNTER — APPOINTMENT (OUTPATIENT)
Dept: CARDIOLOGY | Facility: CLINIC | Age: 61
End: 2021-06-16
Payer: MEDICARE

## 2021-06-16 VITALS
HEIGHT: 74 IN | WEIGHT: 230 LBS | RESPIRATION RATE: 16 BRPM | HEART RATE: 79 BPM | BODY MASS INDEX: 29.52 KG/M2 | SYSTOLIC BLOOD PRESSURE: 138 MMHG | TEMPERATURE: 98.4 F | DIASTOLIC BLOOD PRESSURE: 68 MMHG

## 2021-06-16 PROCEDURE — 93000 ELECTROCARDIOGRAM COMPLETE: CPT

## 2021-06-16 PROCEDURE — 99214 OFFICE O/P EST MOD 30 MIN: CPT

## 2021-06-16 NOTE — REASON FOR VISIT
[FreeTextEntry1] : The patient is a pleasant 60-year-old white male with a past medical history significant for non-ischemic dilated cardiomyopathy (normal coronary arteries on cardiac cath 2017) for which he is status-post AICD implantation as well as paroxysmal atrial fibrillation status-post ablation and reactive airway disease/asthma, who presents for follow up evaluation.

## 2021-06-16 NOTE — HISTORY OF PRESENT ILLNESS
[FreeTextEntry1] : Mr. Giles presents today for follow up evaluation of his hypertension as well as for results of his recent echocardiogram.  He is tolerating the increase in both his losartan and metoprolol succinate well.  States that his numbers at home have been very erratic, running 108-170/.  Has been very compliant with his low sodium diet, has cut down to one cup of coffee daily, tries to hydrate as much as possible, walks approximately 10 miles a day and is down 5 pounds since his last office visit.

## 2021-06-16 NOTE — DISCUSSION/SUMMARY
[FreeTextEntry1] : Dr. Hector in to speak with the patient.\par \par 1 - Hypertension:  blood pressure well controlled on current medications.  WIll continue to monitor pressure at home a few times per week.  Follow low sodium diet and weight loss.  WIll continue to keep caffeine intake to a minimum.\par \par 2 - Dilated cardiomyopathy/status-post AICD implantation:  clinically stable at this time.  Without complaints of chest pain, shortness of breath, palpitations, lightheadedness or syncope. \par Echocardiogram (6/4/2021)"  EF 55-60%.  Mildly increase left ventricular internal cavity size.   Mildly enlarged right ventricle with normal systolic function.  Ascending aortic size normal, corrects for BSA measuring 1.8cm2. (4.2 cm now as compared to 3.9 cm from 8/11/2020).  Will repeat echocardiogram in approximately 6 months.\par \par 3 - Hyperlipidemia:  continue with [id number] daily.  Follow low fat, low cholesterol diet.\par \par 4 - Follow up in 6 weeks.

## 2021-06-16 NOTE — PHYSICAL EXAM
[Well Developed] : well developed [Well Nourished] : well nourished [No Acute Distress] : no acute distress [Normal Conjunctiva] : normal conjunctiva [Normal Venous Pressure] : normal venous pressure [No Carotid Bruit] : no carotid bruit [Normal S1, S2] : normal S1, S2 [No Murmur] : no murmur [No Rub] : no rub [S4] : S4 [No Respiratory Distress] : no respiratory distress  [Wheeze ____] : wheeze [unfilled] [Soft] : abdomen soft [Normal Bowel Sounds] : normal bowel sounds [Normal Gait] : normal gait [No Edema] : no edema [No Rash] : no rash [No Skin Lesions] : no skin lesions [Moves all extremities] : moves all extremities [No Focal Deficits] : no focal deficits [Normal Speech] : normal speech [Alert and Oriented] : alert and oriented [Normal memory] : normal memory

## 2021-07-27 ENCOUNTER — APPOINTMENT (OUTPATIENT)
Dept: CARDIOLOGY | Facility: CLINIC | Age: 61
End: 2021-07-27
Payer: MEDICARE

## 2021-07-27 ENCOUNTER — NON-APPOINTMENT (OUTPATIENT)
Age: 61
End: 2021-07-27

## 2021-07-27 VITALS
RESPIRATION RATE: 16 BRPM | WEIGHT: 237 LBS | SYSTOLIC BLOOD PRESSURE: 128 MMHG | DIASTOLIC BLOOD PRESSURE: 90 MMHG | HEIGHT: 74 IN | BODY MASS INDEX: 30.42 KG/M2 | HEART RATE: 72 BPM

## 2021-07-27 PROCEDURE — 99214 OFFICE O/P EST MOD 30 MIN: CPT

## 2021-07-27 PROCEDURE — 93000 ELECTROCARDIOGRAM COMPLETE: CPT

## 2021-07-27 NOTE — REVIEW OF SYSTEMS
[Weight Gain (___ Lbs)] : [unfilled] ~Ulb weight gain [Feeling Fatigued] : feeling fatigued [Negative] : Heme/Lymph

## 2021-07-27 NOTE — REASON FOR VISIT
[FreeTextEntry1] : The patient is a pleasant 60-year-old white male with a past medical history significant for non-ischemic dilated cardiomyopathy (normal coronary arteries on cardiac cath 2017), status-post AICD implantation as well as paroxysmal atrial fibrillation status-post ablation,reactive airway disease/asthma, hypertension and ascending aorta dilatation, who presents for follow up evaluation.

## 2021-07-27 NOTE — HISTORY OF PRESENT ILLNESS
[FreeTextEntry1] : Mr. Giles presents today without complaints of chest pain, shortness of breath, palpitations, lightheadedness or syncope.  He has been feeling a bit fatigued since the increase in metoprolol succinate in May.  He continues to be physically active, walking several miles five days per week.

## 2021-07-27 NOTE — DISCUSSION/SUMMARY
[FreeTextEntry1] : Dr. Hector in to speak with the patient.\par \par 1 - Hypertension:  repeat blood pressure 140/90.  Will increase HCTZ to 25mg daily and decrease metoprolol succinate as Mr. Giles states that he has been feeling a bit fatigued since the increase to BID.  Advised to follow low sodium diet and weight loss.\par \par 2 - Dilated cardiomyopathy/status-post AICD implantation:  clinically stable at this time.   Denies chest pain, shortness of breath, palpitations, lightheadedness or syncope.  Ascending aorta dilatation, measured 4.2cm on last echocardiogram.  Will have Mr. Giles undergo Chest CT to better assess size of ascending aorta.  Advised patient to maintain his blood pressure well controlled and to avoid any strenuous activity and heavy lifting greater than 50 pounds.  Patient has follow up appointment with Dr. Vicente next week for device check. \par \par 3 - Paroxysmal atrial fibrillation status-post ablation: today's EKG reveals Sinus rhythm.  Tolerating Eliquis 5mg BID well.\par \par 4 - Fasting blood work prior to follow up visit.\par \par 5 - Follow up in 6 weeks.

## 2021-07-29 ENCOUNTER — RX RENEWAL (OUTPATIENT)
Age: 61
End: 2021-07-29

## 2021-08-26 ENCOUNTER — RX RENEWAL (OUTPATIENT)
Age: 61
End: 2021-08-26

## 2021-09-07 ENCOUNTER — APPOINTMENT (OUTPATIENT)
Dept: CARDIOLOGY | Facility: CLINIC | Age: 61
End: 2021-09-07
Payer: MEDICARE

## 2021-09-07 VITALS
BODY MASS INDEX: 29 KG/M2 | HEART RATE: 99 BPM | DIASTOLIC BLOOD PRESSURE: 88 MMHG | SYSTOLIC BLOOD PRESSURE: 130 MMHG | RESPIRATION RATE: 16 BRPM | WEIGHT: 226 LBS | HEIGHT: 74 IN

## 2021-09-07 PROCEDURE — 93000 ELECTROCARDIOGRAM COMPLETE: CPT

## 2021-09-07 PROCEDURE — 99214 OFFICE O/P EST MOD 30 MIN: CPT

## 2021-09-07 RX ORDER — METOPROLOL SUCCINATE 25 MG/1
25 TABLET, EXTENDED RELEASE ORAL DAILY
Qty: 90 | Refills: 3 | Status: DISCONTINUED | COMMUNITY
End: 2021-09-07

## 2021-09-08 ENCOUNTER — APPOINTMENT (OUTPATIENT)
Dept: CARDIOLOGY | Facility: CLINIC | Age: 61
End: 2021-09-08
Payer: MEDICARE

## 2021-09-08 ENCOUNTER — RX RENEWAL (OUTPATIENT)
Age: 61
End: 2021-09-08

## 2021-09-08 PROCEDURE — 93306 TTE W/DOPPLER COMPLETE: CPT

## 2021-09-13 NOTE — ASSESSMENT
[FreeTextEntry1] : 1.  EKG today demonstrates normal sinus rhythm at 99 bpm.  Left anterior hemiblock with left axis deviation.  LVH voltage.  No ischemic changes.  \par \par 2.  Exacerbation of reactive airway disease/asthma:  Patient presents today with hoarseness in his voice and on auscultation, decrease airflow.  No wheezing noted.  Pulse oximetry on root air 95%.  The patient continues to complain of significant dyspnea on exertion and fatigue.  Currently under the care of Dr. Manning, a pulmonologist near his home.  I do not feel that his current status is cardiac in any way given multiple echoes demonstrating normal LV function as well as normal coronaries on cardiac cath four years ago.  Will speak with Dr. Manning further.  Patient wishes a second opinion as well.  \par \par 3.  Given the above, will discontinue Metoprolol Succinate 25 mg daily and prescribe diltiazem  mg daily.  There may also be an element of anxiety in this particular setting.  Will prescribe Alprazolam 0.25 mg b.i.d. p.r.n.  Patient will undergo a follow up echocardiogram and an office visit within two weeks.      \par

## 2021-09-13 NOTE — HISTORY OF PRESENT ILLNESS
[FreeTextEntry1] :  Mr. Giles states that in early August, he was walking daily up to six miles at a time.  This was done outdoors and occurred without significant dyspnea on exertion or any chest pain.  He did note the gradual but progressive developing of wheezing which he endured for approximately two weeks before becoming progressively short of breath and developing some chest discomfort.  He was admitted to [facility] on August 13 with an apparent exacerbation of his reactive airway disease/asthma (? COPD).  Patient treated with nebulizer treatments, oxygen, and high-dose steroids.  Subsequently released only to return on 8/18 with recurrence of symptoms.  Once again, the patient was treated aggressively with pulmonary medication and subsequently discharged.  During one of those stays, he underwent echocardiography which revealed normal left ventricular chamber dimensions and wall motion with preserved ejection fraction estimated between 65 and 70%.  There were no other significant findings.  Apparently, the pulmonary pressures at that time were 28 mmHg.

## 2021-09-13 NOTE — PHYSICAL EXAM
[General Appearance - Well Developed] : well developed [General Appearance - Well Nourished] : well nourished [General Appearance - In No Acute Distress] : no acute distress [Normal Conjunctiva] : the conjunctiva exhibited no abnormalities [Normal Oral Mucosa] : normal oral mucosa [Heart Rate And Rhythm] : heart rate and rhythm were normal [Heart Sounds] : normal S1 and S2 [Bowel Sounds] : normal bowel sounds [Abnormal Walk] : normal gait [Skin Color & Pigmentation] : normal skin color and pigmentation [Skin Turgor] : normal skin turgor [Oriented To Time, Place, And Person] : oriented to person, place, and time [Affect] : the affect was normal [Mood] : the mood was normal [FreeTextEntry1] : No edema

## 2021-09-13 NOTE — REASON FOR VISIT
[FreeTextEntry1] : Mr. Giles is a pleasant 60-year-old white male with a past medical history significant for non-ischemic/dilated cardiomyopathy with normal coronary arteries noted on cardiac catheterization in 2017, patient status-post AICD implantation for symptomatic ventricular arrhythmia as well as paroxysmal atrial fibrillation for which he is status-post ablation, and lastly, reactive airway disease/asthma with recent exacerbation. \par \par

## 2021-09-16 ENCOUNTER — APPOINTMENT (OUTPATIENT)
Dept: PULMONOLOGY | Facility: CLINIC | Age: 61
End: 2021-09-16
Payer: MEDICARE

## 2021-09-16 ENCOUNTER — NON-APPOINTMENT (OUTPATIENT)
Age: 61
End: 2021-09-16

## 2021-09-16 ENCOUNTER — INPATIENT (INPATIENT)
Facility: HOSPITAL | Age: 61
LOS: 0 days | Discharge: HOME | DRG: 287 | End: 2021-09-17
Attending: STUDENT IN AN ORGANIZED HEALTH CARE EDUCATION/TRAINING PROGRAM | Admitting: FAMILY MEDICINE
Payer: MEDICARE

## 2021-09-16 VITALS
WEIGHT: 222.01 LBS | RESPIRATION RATE: 18 BRPM | TEMPERATURE: 99 F | HEIGHT: 74 IN | DIASTOLIC BLOOD PRESSURE: 85 MMHG | OXYGEN SATURATION: 97 % | HEART RATE: 92 BPM | SYSTOLIC BLOOD PRESSURE: 119 MMHG

## 2021-09-16 VITALS
SYSTOLIC BLOOD PRESSURE: 110 MMHG | HEART RATE: 96 BPM | DIASTOLIC BLOOD PRESSURE: 80 MMHG | OXYGEN SATURATION: 96 % | BODY MASS INDEX: 28.62 KG/M2 | HEIGHT: 74 IN | RESPIRATION RATE: 16 BRPM | WEIGHT: 223 LBS

## 2021-09-16 DIAGNOSIS — Z95.810 PRESENCE OF AUTOMATIC (IMPLANTABLE) CARDIAC DEFIBRILLATOR: Chronic | ICD-10-CM

## 2021-09-16 DIAGNOSIS — R07.9 CHEST PAIN, UNSPECIFIED: ICD-10-CM

## 2021-09-16 DIAGNOSIS — Z90.49 ACQUIRED ABSENCE OF OTHER SPECIFIED PARTS OF DIGESTIVE TRACT: Chronic | ICD-10-CM

## 2021-09-16 LAB
ACANTHOCYTES BLD QL SMEAR: SLIGHT — SIGNIFICANT CHANGE UP
ALBUMIN SERPL ELPH-MCNC: 4.4 G/DL — SIGNIFICANT CHANGE UP (ref 3.3–5.2)
ALP SERPL-CCNC: 31 U/L — LOW (ref 40–120)
ALT FLD-CCNC: 67 U/L — HIGH
ANION GAP SERPL CALC-SCNC: 12 MMOL/L — SIGNIFICANT CHANGE UP (ref 5–17)
ANISOCYTOSIS BLD QL: SIGNIFICANT CHANGE UP
APTT BLD: 33.1 SEC — SIGNIFICANT CHANGE UP (ref 27.5–35.5)
AST SERPL-CCNC: 34 U/L — SIGNIFICANT CHANGE UP
BASO STIPL BLD QL SMEAR: PRESENT — SIGNIFICANT CHANGE UP
BASOPHILS # BLD AUTO: 0.08 K/UL — SIGNIFICANT CHANGE UP (ref 0–0.2)
BASOPHILS NFR BLD AUTO: 0.9 % — SIGNIFICANT CHANGE UP (ref 0–2)
BILIRUB SERPL-MCNC: 0.9 MG/DL — SIGNIFICANT CHANGE UP (ref 0.4–2)
BUN SERPL-MCNC: 21.8 MG/DL — HIGH (ref 8–20)
CALCIUM SERPL-MCNC: 9.9 MG/DL — SIGNIFICANT CHANGE UP (ref 8.6–10.2)
CHLORIDE SERPL-SCNC: 96 MMOL/L — LOW (ref 98–107)
CO2 SERPL-SCNC: 28 MMOL/L — SIGNIFICANT CHANGE UP (ref 22–29)
CREAT SERPL-MCNC: 1.03 MG/DL — SIGNIFICANT CHANGE UP (ref 0.5–1.3)
ELLIPTOCYTES BLD QL SMEAR: SLIGHT — SIGNIFICANT CHANGE UP
EOSINOPHIL # BLD AUTO: 0.08 K/UL — SIGNIFICANT CHANGE UP (ref 0–0.5)
EOSINOPHIL NFR BLD AUTO: 0.9 % — SIGNIFICANT CHANGE UP (ref 0–6)
GLUCOSE SERPL-MCNC: 92 MG/DL — SIGNIFICANT CHANGE UP (ref 70–99)
HCT VFR BLD CALC: 39.7 % — SIGNIFICANT CHANGE UP (ref 39–50)
HGB BLD-MCNC: 12.8 G/DL — LOW (ref 13–17)
INR BLD: 1.3 RATIO — HIGH (ref 0.88–1.16)
LYMPHOCYTES # BLD AUTO: 2.34 K/UL — SIGNIFICANT CHANGE UP (ref 1–3.3)
LYMPHOCYTES # BLD AUTO: 28 % — SIGNIFICANT CHANGE UP (ref 13–44)
MANUAL SMEAR VERIFICATION: SIGNIFICANT CHANGE UP
MCHC RBC-ENTMCNC: 20.3 PG — LOW (ref 27–34)
MCHC RBC-ENTMCNC: 32.2 GM/DL — SIGNIFICANT CHANGE UP (ref 32–36)
MCV RBC AUTO: 62.9 FL — LOW (ref 80–100)
MICROCYTES BLD QL: SIGNIFICANT CHANGE UP
MONOCYTES # BLD AUTO: 0.29 K/UL — SIGNIFICANT CHANGE UP (ref 0–0.9)
MONOCYTES NFR BLD AUTO: 3.5 % — SIGNIFICANT CHANGE UP (ref 2–14)
NEUTROPHILS # BLD AUTO: 5.49 K/UL — SIGNIFICANT CHANGE UP (ref 1.8–7.4)
NEUTROPHILS NFR BLD AUTO: 65.8 % — SIGNIFICANT CHANGE UP (ref 43–77)
NRBC # BLD: 1 /100 — HIGH (ref 0–0)
NT-PROBNP SERPL-SCNC: 12 PG/ML — SIGNIFICANT CHANGE UP (ref 0–300)
OVALOCYTES BLD QL SMEAR: SIGNIFICANT CHANGE UP
PLAT MORPH BLD: NORMAL — SIGNIFICANT CHANGE UP
PLATELET # BLD AUTO: 143 K/UL — LOW (ref 150–400)
POIKILOCYTOSIS BLD QL AUTO: SIGNIFICANT CHANGE UP
POLYCHROMASIA BLD QL SMEAR: SIGNIFICANT CHANGE UP
POTASSIUM SERPL-MCNC: 3.8 MMOL/L — SIGNIFICANT CHANGE UP (ref 3.5–5.3)
POTASSIUM SERPL-SCNC: 3.8 MMOL/L — SIGNIFICANT CHANGE UP (ref 3.5–5.3)
PROT SERPL-MCNC: 6.8 G/DL — SIGNIFICANT CHANGE UP (ref 6.6–8.7)
PROTHROM AB SERPL-ACNC: 14.9 SEC — HIGH (ref 10.6–13.6)
RBC # BLD: 6.31 M/UL — HIGH (ref 4.2–5.8)
RBC # FLD: 18.2 % — HIGH (ref 10.3–14.5)
RBC BLD AUTO: ABNORMAL
SARS-COV-2 RNA SPEC QL NAA+PROBE: SIGNIFICANT CHANGE UP
SMUDGE CELLS # BLD: PRESENT — SIGNIFICANT CHANGE UP
SODIUM SERPL-SCNC: 135 MMOL/L — SIGNIFICANT CHANGE UP (ref 135–145)
TROPONIN T SERPL-MCNC: <0.01 NG/ML — SIGNIFICANT CHANGE UP (ref 0–0.06)
VARIANT LYMPHS # BLD: 0.9 % — SIGNIFICANT CHANGE UP (ref 0–6)
WBC # BLD: 8.35 K/UL — SIGNIFICANT CHANGE UP (ref 3.8–10.5)
WBC # FLD AUTO: 8.35 K/UL — SIGNIFICANT CHANGE UP (ref 3.8–10.5)

## 2021-09-16 PROCEDURE — 99285 EMERGENCY DEPT VISIT HI MDM: CPT

## 2021-09-16 PROCEDURE — 99222 1ST HOSP IP/OBS MODERATE 55: CPT

## 2021-09-16 PROCEDURE — 71045 X-RAY EXAM CHEST 1 VIEW: CPT | Mod: 26

## 2021-09-16 PROCEDURE — 93010 ELECTROCARDIOGRAM REPORT: CPT

## 2021-09-16 PROCEDURE — 99205 OFFICE O/P NEW HI 60 MIN: CPT

## 2021-09-16 RX ORDER — COLCHICINE 0.6 MG
0.6 TABLET ORAL DAILY
Refills: 0 | Status: DISCONTINUED | OUTPATIENT
Start: 2021-09-16 | End: 2021-09-17

## 2021-09-16 RX ORDER — TAMSULOSIN HYDROCHLORIDE 0.4 MG/1
0.4 CAPSULE ORAL AT BEDTIME
Refills: 0 | Status: DISCONTINUED | OUTPATIENT
Start: 2021-09-16 | End: 2021-09-17

## 2021-09-16 RX ORDER — UBIDECARENONE 200 MG
200 CAPSULE ORAL
Refills: 0 | Status: ACTIVE | COMMUNITY

## 2021-09-16 RX ORDER — NITROGLYCERIN 6.5 MG
0.4 CAPSULE, EXTENDED RELEASE ORAL
Refills: 0 | Status: DISCONTINUED | OUTPATIENT
Start: 2021-09-16 | End: 2021-09-17

## 2021-09-16 RX ORDER — IPRATROPIUM/ALBUTEROL SULFATE 18-103MCG
3 AEROSOL WITH ADAPTER (GRAM) INHALATION EVERY 6 HOURS
Refills: 0 | Status: DISCONTINUED | OUTPATIENT
Start: 2021-09-16 | End: 2021-09-17

## 2021-09-16 RX ORDER — IPRATROPIUM BROMIDE AND ALBUTEROL SULFATE .5; 3 MG/3ML; MG/3ML
2.5-0.5 SOLUTION RESPIRATORY (INHALATION)
Refills: 0 | Status: ACTIVE | COMMUNITY

## 2021-09-16 RX ORDER — DILTIAZEM HCL 120 MG
120 CAPSULE, EXT RELEASE 24 HR ORAL DAILY
Refills: 0 | Status: DISCONTINUED | OUTPATIENT
Start: 2021-09-16 | End: 2021-09-16

## 2021-09-16 RX ORDER — ATORVASTATIN CALCIUM 80 MG/1
10 TABLET, FILM COATED ORAL AT BEDTIME
Refills: 0 | Status: DISCONTINUED | OUTPATIENT
Start: 2021-09-16 | End: 2021-09-17

## 2021-09-16 RX ORDER — DILTIAZEM HCL 120 MG
120 CAPSULE, EXT RELEASE 24 HR ORAL DAILY
Refills: 0 | Status: DISCONTINUED | OUTPATIENT
Start: 2021-09-16 | End: 2021-09-17

## 2021-09-16 RX ORDER — SENNA PLUS 8.6 MG/1
2 TABLET ORAL AT BEDTIME
Refills: 0 | Status: DISCONTINUED | OUTPATIENT
Start: 2021-09-16 | End: 2021-09-17

## 2021-09-16 RX ORDER — ALPRAZOLAM 0.25 MG/1
0.25 TABLET ORAL
Qty: 30 | Refills: 0 | Status: DISCONTINUED | COMMUNITY
Start: 2021-09-07 | End: 2021-09-16

## 2021-09-16 RX ORDER — MESALAMINE 400 MG
1000 TABLET, DELAYED RELEASE (ENTERIC COATED) ORAL
Refills: 0 | Status: DISCONTINUED | OUTPATIENT
Start: 2021-09-16 | End: 2021-09-17

## 2021-09-16 RX ORDER — PANTOPRAZOLE SODIUM 20 MG/1
40 TABLET, DELAYED RELEASE ORAL DAILY
Refills: 0 | Status: DISCONTINUED | OUTPATIENT
Start: 2021-09-16 | End: 2021-09-17

## 2021-09-16 RX ORDER — OMEGA-3 ACID ETHYL ESTERS 1 G
2 CAPSULE ORAL
Refills: 0 | Status: DISCONTINUED | OUTPATIENT
Start: 2021-09-16 | End: 2021-09-17

## 2021-09-16 RX ADMIN — TAMSULOSIN HYDROCHLORIDE 0.4 MILLIGRAM(S): 0.4 CAPSULE ORAL at 23:07

## 2021-09-16 RX ADMIN — PANTOPRAZOLE SODIUM 40 MILLIGRAM(S): 20 TABLET, DELAYED RELEASE ORAL at 23:07

## 2021-09-16 RX ADMIN — ATORVASTATIN CALCIUM 10 MILLIGRAM(S): 80 TABLET, FILM COATED ORAL at 23:07

## 2021-09-16 RX ADMIN — Medication 120 MILLIGRAM(S): at 23:07

## 2021-09-16 RX ADMIN — Medication 0.6 MILLIGRAM(S): at 23:07

## 2021-09-16 RX ADMIN — SENNA PLUS 2 TABLET(S): 8.6 TABLET ORAL at 23:07

## 2021-09-16 NOTE — ED ADULT NURSE NOTE - OBJECTIVE STATEMENT
Pt. presents alert and oriented x 4 to ED sent in by cardiologist. Pt. has hx non ischemic dilated cardiomyopathy. Pt. reports he has increasing activity intolerance and MEDRANO over the last 2 weeks. Pt. reports hx multiple accounts of pericarditis with unknown cause, cardiologist sent pt in for exploratory cath to determine possible blockage or constriction causing pericarditis. Pt. ACE, VSS, skin warm and dry, breathing even and unlabored on room air, pt. in NSR on cardiac monitor, normotensive. Pt. ambulatory with steady gait. Pt. denies chest pain at this time, denies SOB at rest.

## 2021-09-16 NOTE — ED ADULT TRIAGE NOTE - CHIEF COMPLAINT QUOTE
Patient ambulated into ED with steady gait, Pt c/o chest pain MEDRANO x 2weeks, worsening last night saw pulmonologist today sent to ED.

## 2021-09-16 NOTE — ED PROVIDER NOTE - ATTENDING CONTRIBUTION TO CARE
I performed a face to face history and physical exam of the patient and discussed their management with the student/resident/ACP. I reviewed the student/resident/ACP's note and agree with the documented findings and plan of care.    Pt states that for the past month he has had worsening cp and MEDRANO. Pt states that he saw his pulmonologist this morning and was told it is not related to his lungs and called his cardiologist and was told to come to the ER.    physical - rrr. ctab. abd - soft, nt. no edema. no rash.    plan - labs and imaging reviewed.  Dr. Stanley evaluated patient and recommended admission for cath tomorrow.

## 2021-09-16 NOTE — ED ADULT NURSE REASSESSMENT NOTE - NS ED NURSE REASSESS COMMENT FT1
Pt. resting comfortably in stretcher, in NAD. VSS. Pt. given turkey sandwich and water as per request. Pt. ADMITTED to telemetry at this time. Pt. NPO at midnight pending cardiac cath tomorrow, pt. educated on plan of care and pt. verbalized understanding. Pt. ACE, skin warm and dry. Call bell within reach.

## 2021-09-16 NOTE — CONSULT LETTER
[Dear  ___] : Dear  [unfilled], [Consult Letter:] : I had the pleasure of evaluating your patient, [unfilled]. [Please see my note below.] : Please see my note below. [Consult Closing:] : Thank you very much for allowing me to participate in the care of this patient.  If you have any questions, please do not hesitate to contact me. [Sincerely,] : Sincerely, [DrIvan  ___] : Dr. HARRIS [FreeTextEntry3] : Romain Vega MD, FCCP, D. ABSM\par Pulmonary and Sleep Medicine\par [facility] [place]

## 2021-09-16 NOTE — ED ADULT NURSE NOTE - ISOLATION TYPE:
Hospital Discharge Follow-Up Date/Time:  9/24/2018 4:54 PM 
 
Patient was admitted to [facility] on 9/19/18 and discharged on 9/23/18 for pleuritic chest pain, SIRS. The physician discharge summary was available at the time of outreach. NN attempted to contact patient within 2 business days of discharge. Inpatient RRAT score: NA Was this a readmission? no  
 
 
NN attempted to contact patient at listed number. VM left identifying self. Direct contact information given with request for return call. NN contacted patient's daughter, Mrs. Ángel Ledesma, who states that patient is home but is probably asleep. NN discussed need for patient to have post hospital follow up with Dr. Dustin Seymour within 3-7 days of discharge. Mrs. Ángel Ledesma states she will call and schedule for this week if possible. NN will attempt to contact patient at later time.
None

## 2021-09-16 NOTE — HISTORY OF PRESENT ILLNESS
[Initial Evaluation] : an initial evaluation of [Excessive Daytime Sleepiness] : excessive daytime sleepiness [Witnessed Apnea During Sleep] : witnessed apnea during sleep [Witnessed Gasping During Sleep] : witnessed gasping during sleep [Snoring] : snoring [Unrefreshing Sleep] : unrefreshing sleep [Sleepy When Sedentary] : sleepy when sedentary [Currently Experiencing] : The patient is currently experiencing symptoms. [None] : The patient is not currently being treated for this problem [Former] : former [Never] : never [TextBox_4] : The patient gives a h/o possible asthma vs COPD or reactive airways disease for which he has been maintained on Symbicort on Montelukast therapy. \par He is followed with cardiology for reported nonischemic CM though now with normal EF, s/p AICD for reported ventricular arrhythmia, and h/o pericarditis for which he is on [place]. \par He reports that he normal can walk up to 6 miles daily but over the past 6 weeks he is having difficulty walking even a few blocks. He was seen by his PCP/pulm, cardiology and even was admitted to [facility] from 8/30/21-9/3/21 but w/u including CXR and CTA were unremarkable. He was d/c'd as a COPD exacerbation. [TextBox_11] : 1.5 [TextBox_13] : 4 [YearQuit] : 1980

## 2021-09-16 NOTE — ED PROVIDER NOTE - NS ED ROS FT
Constitutional: no fever, no chills  Head: NC, AT   Eyes: no redness   ENMT: no nasal congestion/drainage, no sore throat   CV: + chest pain, no edema  Resp: no cough, + dyspnea  GI: no abdominal pain, no nausea, no vomiting, no diarrhea  : no dysuria, no hematuria   Skin: no lesions, no rashes   Neuro: no LOC, no headache, no sensory deficits, no weakness

## 2021-09-16 NOTE — CONSULT NOTE ADULT - ASSESSMENT
Assessment:  Pietro Giles is a 60 year old man with past medical history of Non ischemic dilated cardiomyopathy (LVEF 55-60% in 6/2021), Normal coronaries in cardiac catherization 2017, Ventricular arrhythmias s/p ICD, Paroxysmal atrial fibrillation (s/p ablation, on Eliquis) & Reactive airways disease/Asthma and history of pericarditis who presents with       Recommendations:    Plan for right and left heart catherization to rule out constriction given several episodes of pericarditis in past per his cardiologist, Dr. Hector. Will check LVEDP. Assessment:  Pietro Giles is a 60 year old man with past medical history of Non ischemic dilated cardiomyopathy (LVEF 55-60% in 6/2021), Normal coronaries in cardiac catherization 2017, Ventricular arrhythmias s/p ICD, Paroxysmal atrial fibrillation (s/p ablation, on Eliquis) & Reactive airways disease/Asthma and history of pericarditis who presents with progressive dyspnea on exertion, sent in by cardiologist to evaluate for constrictive cardiomyopathy. ECG with no acute ischemic ST changes, BP normotensive and physical exam unremarkable with no pericardial friction rub.       Recommendations:  [] Dyspnea on exertion: Plan for right and left heart catherization to rule out constriction given several episodes of pericarditis in past per his cardiologist. Also to evaluate for obstructive CAD as well. Please keep NPO after MN tonight and hold evening dose of Eliquis and hold dose tomorrow. Place patient on telemetry. Check troponins x 3, pro BNP and CMP, CBC, coagulopathy panel. Check Chest xray, evaluate for pericardial calcification. Check echocardiogram to evaluate for pericardial thickening and abnormal septal motion  [] Paroxysmal atrial fibrillation: Currently in normal sinus rhythm. Hold Eliquis for catherizations. Continue Diltiazem 120 mg ER daily.   [] Hold HCTZ prior to coronary angiogram.    We will continue to follow along.    Discussed with Dr. Chase.    Juan Stanley MD  Cardiology

## 2021-09-16 NOTE — H&P ADULT - HISTORY OF PRESENT ILLNESS
pt. is a 60 year old male pt.  with past medical history of Non ischemic dilated cardiomyopathy (LVEF 55-60% in 6/2021), Normal coronaries on cardiac catherization 2017, Ventricular arrhythmias s/p ICD, Paroxysmal atrial fibrillation (s/p ablation, on Eliquis) & Reactive airways disease/Asthma and history of pericarditis who presents with progressive dyspnea on minimal exertion since August. The patient reports prior to August he walked ~ 5 miles most days of the week and did not have any limiting symptoms, however since early August he has had dyspnea on minimal exertion. He denies exertional angina, but can experience occasional chest pain. Reports history of pericarditis > 1 year ago and having this few times in the past with unknown cause. No leg edema now. Has occasional palpitations and dizziness but no syncope. no abd. pain, no n/v/d. no sig. cough, no fever, no sick contact. pt. is evaluated by cardiologist dr. Dunlap and is planned for cardiac cath in am.  pt. is a 60 year old male pt.  with past medical history of Non ischemic dilated cardiomyopathy (LVEF 55-60% in 6/2021), Normal coronaries on cardiac catherization 2017, Ventricular arrhythmias s/p ICD, Paroxysmal atrial fibrillation (s/p ablation, on Eliquis) & airway Reactive disease/Asthma and history of pericarditis who presents with progressive dyspnea on minimal exertion since August. The patient reports prior to August he walked ~ 5 miles most days of the week and did not have any limiting symptoms, however since early August he has had dyspnea on minimal exertion.  no sig, cough, no orthopnea, no leg edema. He denies exertional angina, but can experience occasional chest pain. Reports history of pericarditis > 1 year ago and having this few times in the past with unknown cause. No leg edema now. Has occasional palpitations and dizziness but no syncope. no abd. pain, no n/v/d. no sig. cough, no fever, no sick contact. pt. is evaluated by cardiologist dr. Dunlap and is planned for cardiac cath in am.

## 2021-09-16 NOTE — REVIEW OF SYSTEMS
[Chest Tightness] : chest tightness [Wheezing] : wheezing [SOB on Exertion] : sob on exertion [Chest Discomfort] : chest discomfort [Palpitations] : palpitations [Seasonal Allergies] : seasonal allergies [GERD] : gerd [Diarrhea] : diarrhea [Constipation] : constipation [Dizziness] : dizziness [Fever] : no fever [Chills] : no chills [Nasal Congestion] : no nasal congestion [Postnasal Drip] : no postnasal drip [Sinus Problems] : no sinus problems [Cough] : no cough [Sputum] : no sputum [Dyspnea] : no dyspnea [Pleuritic Pain] : no pleuritic pain [Edema] : no edema [Syncope] : no syncope [Abdominal Pain] : no abdominal pain [Nausea] : no nausea [Vomiting] : no vomiting [Back Pain] : no back pain [Anemia] : no anemia [Headache] : no headache [Seizures] : no seizures [Numbness] : no numbness [Paralysis] : no paralysis [Confusion] : no confusion [Depression] : no depression [Anxiety] : no anxiety [Diabetes] : no diabetes [Thyroid Problem] : no thyroid problem

## 2021-09-16 NOTE — ED PROVIDER NOTE - CLINICAL SUMMARY MEDICAL DECISION MAKING FREE TEXT BOX
59 y/o M with PMHx COPD, CAD, VTach, AFib, s/p AICD placement, aortic aneurysm, Cardiomyopathy, Chron' s disease, and GERD who presents to the ED with  chest pain and MEDRANO for the past two weeks. Labs, EKG, CXR, trops, NBP, cards consult.

## 2021-09-16 NOTE — ED ADULT NURSE NOTE - NSICDXPASTMEDICALHX_GEN_ALL_CORE_FT
PAST MEDICAL HISTORY:  Aortic aneurysm     Asthma     Atrial fibrillation     Bronchitis     Cardiomyopathy     COPD (chronic obstructive pulmonary disease)     Crohns disease     Enlarged prostate     Hypertension     Mediterranean anemia     Pacemaker     Paroxysmal atrial fibrillation     Pericarditis     Ventricular tachycardia

## 2021-09-16 NOTE — H&P ADULT - ASSESSMENT
pt. is a 60 year old male pt.  with past medical history of Non ischemic dilated cardiomyopathy (LVEF 55-60% in 6/2021), Normal coronaries on cardiac catherization 2017, Ventricular arrhythmias s/p ICD, Paroxysmal atrial fibrillation (s/p ablation, on Eliquis) & airway Reactive disease/Asthma and history of pericarditis who presents with progressive dyspnea on minimal exertion since August. The patient reports prior to August he walked ~ 5 miles most days of the week and did not have any limiting symptoms, however since early August he has had dyspnea on minimal exertion. no sig, cough, no orthopnea, no leg edema. He denies exertional angina, but can experience occasional chest pain. Reports history of pericarditis > 1 year ago and having this few times in the past with unknown cause. No leg edema now. Has occasional palpitations and dizziness but no syncope. no abd. pain, no n/v/d. no sig. cough, no fever, no sick contact. pt. is evaluated by cardiologist dr. Dunlap and is planned for cardiac cath in am.     - MEDRANO, pt. will be admitted to tele, trop negative x 1, bnp 12, not volume over loaded, r/o ischemia , plan for cardiac cath in am. NTG prn. continue statin.     - Essential htn, bp controlled, continue cardizem, as per pt. not using toprol xL anymore.     -PAF, pt. on eliquis but will be on hold tonight as per cardiologist in anticipation of cardiac cath in am. continue cardizem.     - h/o asthma, not in exacerbation, no wheeze or rales, o2 sat 85 % RA, will keep on duoneb.     - S/P AICD, no c/o  aicd shock or palpitations.

## 2021-09-16 NOTE — DISCUSSION/SUMMARY
[FreeTextEntry1] : \par #1. Will schedule PFTs in near future to assess lung function \par #2. The patient is maintained on Symbicort and Montelukast but it is unclear whether this patient requires chronic BD therapy as no prior lung function testing is available\par #3. Diet and exercise for weight loss\par #4. SOBOE is likely related to weight or deconditioning \par #5. F/u ASAP with PFTs\par #6. PSG to evaluate for possible ABBIE. Consider HST if not approved. \par #7. Prior lung imaging studies were unremarkable\par #8. D/w cardiology for possible cardiac cath for possible CAD though prior cath in 2017 was reportedly unremarkable\par #9. Pt had both Covid vaccines \par #10. Reviewed risks of exposure and symptoms of Covid-19 virus, including how the virus is spread and precautions to avoid alessia virus.\par \par Patient's questions were answered and patient appears to understand these recommendations \par d/w cardiology and agrees with above plan\par Called PCP but no one available - pt to arrange for prior records/lung function testing if previously done

## 2021-09-16 NOTE — ED ADULT NURSE NOTE - NSICDXPASTSURGICALHX_GEN_ALL_CORE_FT
PAST SURGICAL HISTORY:  AICD (automatic cardioverter/defibrillator) present     History of appendectomy     History of cholecystectomy

## 2021-09-16 NOTE — H&P ADULT - NSHPPHYSICALEXAM_GEN_ALL_CORE
Vital Signs Last 24 Hrs  T(C): 37.1 (16 Sep 2021 23:58), Max: 37.2 (16 Sep 2021 16:20)  T(F): 98.7 (16 Sep 2021 23:58), Max: 99 (16 Sep 2021 16:20)  HR: 60 (16 Sep 2021 23:58) (60 - 92)  BP: 113/77 (16 Sep 2021 23:58) (113/77 - 124/76)  BP(mean): --  RR: 18 (16 Sep 2021 23:58) (16 - 18)  SpO2: 95% (16 Sep 2021 23:58) (95% - 99%)    General: pt. in bed not in distress.  HEENT: AT, NC. PERRL. intact EOM. no throat erythema or exudate.   Neck: supple. no JVD.   Chest: CTA bilaterally, no w /r/r.  Heart: S1,S2. RRR. no heart murmur. no edema.  Abdomen: soft. non-tender. non-distended. + BS.   Ext: no calf tenderness. ROM of all ext. intact. distal pulses 2 +.  Neuro: AAO x3. no focal weakness. no speech disorder, cns ii to xii intact. m /r/s intact.  Skin: no rash noted, warm and dry.   psych : normal affect, no si/hi.

## 2021-09-16 NOTE — ED PROVIDER NOTE - PHYSICAL EXAMINATION
General: well appearing, NAD  Head: NC, AT  EENT: EOMI, no scleral icterus  Cardiac: RRR, no apparent murmurs, no lower extremity edema  Respiratory: CTABL, no respiratory distress   Abdomen: soft, ND, NT, nonperitonitic  MSK/Vascular: full ROM, soft compartments, warm extremities  Neuro: AAOx3, sensation to light touch intact  Psych: calm, cooperative

## 2021-09-16 NOTE — H&P ADULT - NSICDXPASTMEDICALHX_GEN_ALL_CORE_FT
PAST MEDICAL HISTORY:  Aortic aneurysm     Asthma     Atrial fibrillation     Bronchitis     Cardiomyopathy     COPD (chronic obstructive pulmonary disease)     Crohns disease     Enlarged prostate     Hypertension     Mediterranean anemia     Pacemaker     Paroxysmal atrial fibrillation     Pericarditis     Ventricular tachycardia      PAST MEDICAL HISTORY:  Aortic aneurysm     Asthma     Atrial fibrillation     Bronchitis     Cardiomyopathy     COPD (chronic obstructive pulmonary disease)     Crohn disease     Crohns disease     Enlarged prostate     Hypertension     Mediterranean anemia     Pacemaker     Paroxysmal atrial fibrillation     Pericarditis     Ventricular tachycardia

## 2021-09-16 NOTE — CONSULT NOTE ADULT - SUBJECTIVE AND OBJECTIVE BOX
SOTO GILES  [id number]      HPI:  Soto Giles is a 60 year old man with past medical history of Non ischemic dilated cardiomyopathy, Normal coronaries in cardiac catherization 2017, Ventricular arrhythmias s/p ICD, Paroxysmal atrial fibrillation (s/p ablation, on Eliquis) & Reactive airways disease/Asthma and history of pericarditis who presents with        ALLERGIES:  No Known Allergies      PAST MEDICAL & SURGICAL HISTORY:  Asthma    Bronchitis    COPD (chronic obstructive pulmonary disease)    Mediterranean anemia    Pacemaker    Paroxysmal atrial fibrillation    Aortic aneurysm    Atrial fibrillation    Cardiomyopathy    Hypertension    Ventricular tachycardia    Enlarged prostate    Pericarditis    Crohns disease    History of appendectomy    History of cholecystectomy    AICD (automatic cardioverter/defibrillator) present          CURRENT MEDICATIONS:      SOCIAL HISTORY:      FAMILY HISTORY:  No pertinent family history in first degree relatives        ROS:  All 10 systems reviewed and positives noted in HPI    OBJECTIVE:    VITAL SIGNS:  Vital Signs Last 24 Hrs  T(C): 37.2 (16 Sep 2021 16:20), Max: 37.2 (16 Sep 2021 16:20)  T(F): 99 (16 Sep 2021 16:20), Max: 99 (16 Sep 2021 16:20)  HR: 92 (16 Sep 2021 16:20) (92 - 92)  BP: 119/85 (16 Sep 2021 16:20) (119/85 - 119/85)  BP(mean): --  RR: 18 (16 Sep 2021 16:20) (18 - 18)  SpO2: 97% (16 Sep 2021 16:20) (97% - 97%)    PHYSICAL EXAM:  General: well appearing, no distress  HEENT: sclera anicteric  Neck: supple, no carotid bruits b/l  CVS: JVP ~ 7 cm H20, RRR, s1, s2, no murmurs/rubs/gallops  Chest: unlabored respirations, clear to auscultation b/l  Abdomen: non-distended  Extremities: no lower extremity edema b/l  Neuro: awake, alert & oriented x 3  Psych: normal affect      LABS:      ECG:      TTE:      Home Cardiac Meds:  Diltiazem 120 mg ER daily  HCTZ 25 mg daily  Atorvastatin 10 mg every other day  Losartan 50 mg BID  Eliquis 5 mg BID  Omega-3   SOTO GILES  [id number]      HPI:  Soto Giles is a 60 year old man with past medical history of Non ischemic dilated cardiomyopathy (LVEF 55-60% in 6/2021), Normal coronaries in cardiac catherization 2017, Ventricular arrhythmias s/p ICD, Paroxysmal atrial fibrillation (s/p ablation, on Eliquis) & Reactive airways disease/Asthma and history of pericarditis who presents with        ALLERGIES:  No Known Allergies      PAST MEDICAL & SURGICAL HISTORY:  Non ischemic dilated cardiomyopathy (LVEF 55-60% in 6/2021)  Normal coronaries in cardiac catherization 2017  Ventricular arrhythmias s/p ICD  Paroxysmal atrial fibrillation (s/p ablation, on Eliquis)  Reactive airways disease/Asthma  History of pericarditis  Asthma  COPD (chronic obstructive pulmonary disease)  History of appendectomy  History of cholecystectomy      SOCIAL HISTORY:      FAMILY HISTORY:  No pertinent family history in first degree relatives        ROS:  All 10 systems reviewed and positives noted in HPI    OBJECTIVE:    VITAL SIGNS:  Vital Signs Last 24 Hrs  T(C): 37.2 (16 Sep 2021 16:20), Max: 37.2 (16 Sep 2021 16:20)  T(F): 99 (16 Sep 2021 16:20), Max: 99 (16 Sep 2021 16:20)  HR: 92 (16 Sep 2021 16:20) (92 - 92)  BP: 119/85 (16 Sep 2021 16:20) (119/85 - 119/85)  BP(mean): --  RR: 18 (16 Sep 2021 16:20) (18 - 18)  SpO2: 97% (16 Sep 2021 16:20) (97% - 97%)    PHYSICAL EXAM:  General: well appearing, no distress  HEENT: sclera anicteric  Neck: supple, no carotid bruits b/l  CVS: JVP ~ 7 cm H20, RRR, s1, s2, no murmurs/rubs/gallops  Chest: unlabored respirations, clear to auscultation b/l  Abdomen: non-distended  Extremities: no lower extremity edema b/l  Neuro: awake, alert & oriented x 3  Psych: normal affect      LABS:      ECG (9/16/21): normal sinus rhythm, left axis deviation       Outpatient TTE (6/2021):  LVEF 55-60%  Mildly enlarged RV with normal systolic function    Home Cardiac Meds:  Diltiazem 120 mg ER daily  HCTZ 25 mg daily  Atorvastatin 10 mg every other day  Losartan 50 mg BID  Eliquis 5 mg BID  Omega-3   SOTO GILES  [id number]      HPI:  Soto Giles is a 60 year old man with past medical history of Non ischemic dilated cardiomyopathy (LVEF 55-60% in 6/2021), Normal coronaries in cardiac catherization 2017, Ventricular arrhythmias s/p ICD, Paroxysmal atrial fibrillation (s/p ablation, on Eliquis) & Reactive airways disease/Asthma and history of pericarditis who presents with progressive dyspnea on minimal exertion since August. The patient reports prior to August he walked ~ 5 miles most days of the week and did not have any limiting symptoms, however since early August he has had dyspnea on minimal exertion. He denies exertional angina, but can experience occasional chest pain. Reports history of pericarditis > 1 year ago and having this few times in the past with unknown cause. No leg edema now. Has occasional palpitations and dizziness but no syncope.       ALLERGIES:  No Known Allergies      PAST MEDICAL & SURGICAL HISTORY:  Non ischemic dilated cardiomyopathy (LVEF 55-60% in 6/2021)  Normal coronaries in cardiac catherization 2017  Ventricular arrhythmias s/p ICD  Paroxysmal atrial fibrillation (s/p ablation, on Eliquis)  Reactive airways disease/Asthma  History of pericarditis  Asthma  COPD (chronic obstructive pulmonary disease)  History of appendectomy  History of cholecystectomy      SOCIAL HISTORY:  No cigarette smoking    FAMILY HISTORY:  Heart disease in grandparent      ROS:  All 10 systems reviewed and positives noted in HPI    OBJECTIVE:    VITAL SIGNS:  Vital Signs Last 24 Hrs  T(C): 37.2 (16 Sep 2021 16:20), Max: 37.2 (16 Sep 2021 16:20)  T(F): 99 (16 Sep 2021 16:20), Max: 99 (16 Sep 2021 16:20)  HR: 92 (16 Sep 2021 16:20) (92 - 92)  BP: 119/85 (16 Sep 2021 16:20) (119/85 - 119/85)  BP(mean): --  RR: 18 (16 Sep 2021 16:20) (18 - 18)  SpO2: 97% (16 Sep 2021 16:20) (97% - 97%)    PHYSICAL EXAM:  General: middle aged man, no acute distress  HEENT: sclera anicteric  Neck: supple, no carotid bruits b/l  CVS: JVP ~ 9 cm H20, RRR, s1, s2, no murmurs/rubs  Chest: unlabored respirations, mild wheeze of left lung field, no crackles  Abdomen: non-distended  Extremities: no lower extremity edema b/l  Neuro: awake, alert & oriented x 3  Psych: normal affect      LABS:  No labs resulted yet    ECG (9/16/21): normal sinus rhythm, left axis deviation       Outpatient TTE (6/2021):  LVEF 55-60%  Mildly enlarged RV with normal systolic function    Home Cardiac Meds:  Diltiazem 120 mg ER daily  HCTZ 25 mg daily  Atorvastatin 10 mg every other day  Losartan 50 mg BID  Eliquis 5 mg BID  Omega-3

## 2021-09-16 NOTE — ED PROVIDER NOTE - OBJECTIVE STATEMENT
61 y/o M with PMHx COPD, CAD, VTach, AFib, s/p AICD placement, aortic aneurysm, Cardiomyopathy, Chron' s disease, and GERD who presents to the ED with  chest pain and MEDRANO for the past two weeks. Patient states that these symptoms worsened last night. He states that he has mid-sternal sharp chest pain radiating to his back. He states that he also has MEDRANO and is unable to walk more than a few feet without feeling like he is going to pass out. He denies any syncope or falls and states that he is on Eliquis. He states that with exertion he becomes lightheaded, has changes in vision, and diaphoresis. He states that he has to sit down to relieve these symptoms. He states that he went to his pulmonologist today who told him this episode was unlikely his COPD, and contacted his Cardiologist Dr. Robertson who contacted Dr. Stanley here. He states that they were concerned and told him to come in for a possible cardiac cath. Denies any recent illnesses or other complaints.

## 2021-09-17 ENCOUNTER — TRANSCRIPTION ENCOUNTER (OUTPATIENT)
Age: 61
End: 2021-09-17

## 2021-09-17 VITALS — SYSTOLIC BLOOD PRESSURE: 115 MMHG | DIASTOLIC BLOOD PRESSURE: 72 MMHG | HEART RATE: 64 BPM | RESPIRATION RATE: 17 BRPM

## 2021-09-17 LAB
ANION GAP SERPL CALC-SCNC: 12 MMOL/L — SIGNIFICANT CHANGE UP (ref 5–17)
BUN SERPL-MCNC: 21.6 MG/DL — HIGH (ref 8–20)
CALCIUM SERPL-MCNC: 9.3 MG/DL — SIGNIFICANT CHANGE UP (ref 8.6–10.2)
CHLORIDE SERPL-SCNC: 97 MMOL/L — LOW (ref 98–107)
CHOLEST SERPL-MCNC: 118 MG/DL — SIGNIFICANT CHANGE UP
CO2 SERPL-SCNC: 28 MMOL/L — SIGNIFICANT CHANGE UP (ref 22–29)
COVID-19 SPIKE DOMAIN AB INTERP: POSITIVE
COVID-19 SPIKE DOMAIN ANTIBODY RESULT: 113 U/ML — HIGH
CREAT SERPL-MCNC: 1.03 MG/DL — SIGNIFICANT CHANGE UP (ref 0.5–1.3)
GLUCOSE SERPL-MCNC: 103 MG/DL — HIGH (ref 70–99)
HCV AB S/CO SERPL IA: 0.1 S/CO — SIGNIFICANT CHANGE UP (ref 0–0.99)
HCV AB SERPL-IMP: SIGNIFICANT CHANGE UP
HDLC SERPL-MCNC: 34 MG/DL — LOW
LIPID PNL WITH DIRECT LDL SERPL: 64 MG/DL — SIGNIFICANT CHANGE UP
NON HDL CHOLESTEROL: 84 MG/DL — SIGNIFICANT CHANGE UP
POTASSIUM SERPL-MCNC: 3.5 MMOL/L — SIGNIFICANT CHANGE UP (ref 3.5–5.3)
POTASSIUM SERPL-SCNC: 3.5 MMOL/L — SIGNIFICANT CHANGE UP (ref 3.5–5.3)
SARS-COV-2 IGG+IGM SERPL QL IA: 113 U/ML — HIGH
SARS-COV-2 IGG+IGM SERPL QL IA: POSITIVE
SODIUM SERPL-SCNC: 137 MMOL/L — SIGNIFICANT CHANGE UP (ref 135–145)
TRIGL SERPL-MCNC: 98 MG/DL — SIGNIFICANT CHANGE UP
TROPONIN T SERPL-MCNC: <0.01 NG/ML — SIGNIFICANT CHANGE UP (ref 0–0.06)

## 2021-09-17 PROCEDURE — 99152 MOD SED SAME PHYS/QHP 5/>YRS: CPT

## 2021-09-17 PROCEDURE — 85025 COMPLETE CBC W/AUTO DIFF WBC: CPT

## 2021-09-17 PROCEDURE — 71045 X-RAY EXAM CHEST 1 VIEW: CPT

## 2021-09-17 PROCEDURE — 85730 THROMBOPLASTIN TIME PARTIAL: CPT

## 2021-09-17 PROCEDURE — C1889: CPT

## 2021-09-17 PROCEDURE — U0003: CPT

## 2021-09-17 PROCEDURE — 36415 COLL VENOUS BLD VENIPUNCTURE: CPT

## 2021-09-17 PROCEDURE — C1894: CPT

## 2021-09-17 PROCEDURE — 84484 ASSAY OF TROPONIN QUANT: CPT

## 2021-09-17 PROCEDURE — 85610 PROTHROMBIN TIME: CPT

## 2021-09-17 PROCEDURE — C1760: CPT

## 2021-09-17 PROCEDURE — C1887: CPT

## 2021-09-17 PROCEDURE — 99238 HOSP IP/OBS DSCHRG MGMT 30/<: CPT

## 2021-09-17 PROCEDURE — 86769 SARS-COV-2 COVID-19 ANTIBODY: CPT

## 2021-09-17 PROCEDURE — 80061 LIPID PANEL: CPT

## 2021-09-17 PROCEDURE — 80048 BASIC METABOLIC PNL TOTAL CA: CPT

## 2021-09-17 PROCEDURE — 93460 R&L HRT ART/VENTRICLE ANGIO: CPT | Mod: 26

## 2021-09-17 PROCEDURE — 86803 HEPATITIS C AB TEST: CPT

## 2021-09-17 PROCEDURE — 93005 ELECTROCARDIOGRAM TRACING: CPT

## 2021-09-17 PROCEDURE — 93460 R&L HRT ART/VENTRICLE ANGIO: CPT

## 2021-09-17 PROCEDURE — C1769: CPT

## 2021-09-17 PROCEDURE — 80053 COMPREHEN METABOLIC PANEL: CPT

## 2021-09-17 PROCEDURE — 93567 NJX CAR CTH SPRVLV AORTGRPHY: CPT

## 2021-09-17 PROCEDURE — U0005: CPT

## 2021-09-17 PROCEDURE — 99285 EMERGENCY DEPT VISIT HI MDM: CPT

## 2021-09-17 PROCEDURE — 83880 ASSAY OF NATRIURETIC PEPTIDE: CPT

## 2021-09-17 RX ORDER — DILTIAZEM HCL 120 MG
1 CAPSULE, EXT RELEASE 24 HR ORAL
Qty: 0 | Refills: 0 | DISCHARGE

## 2021-09-17 RX ORDER — POTASSIUM CHLORIDE 20 MEQ
20 PACKET (EA) ORAL ONCE
Refills: 0 | Status: COMPLETED | OUTPATIENT
Start: 2021-09-17 | End: 2021-09-17

## 2021-09-17 RX ORDER — ASPIRIN/CALCIUM CARB/MAGNESIUM 324 MG
81 TABLET ORAL ONCE
Refills: 0 | Status: COMPLETED | OUTPATIENT
Start: 2021-09-17 | End: 2021-09-17

## 2021-09-17 RX ADMIN — Medication 1000 MILLIGRAM(S): at 06:16

## 2021-09-17 RX ADMIN — Medication 20 MILLIEQUIVALENT(S): at 09:02

## 2021-09-17 RX ADMIN — Medication 81 MILLIGRAM(S): at 09:02

## 2021-09-17 NOTE — DISCHARGE NOTE NURSING/CASE MANAGEMENT/SOCIAL WORK - PATIENT PORTAL LINK FT
You can access the FollowMyHealth Patient Portal offered by [facility] by registering at the following website: [email or web address]. By joining [facility]’s FollowMyHealth portal, you will also be able to view your health information using other applications (apps) compatible with our system.

## 2021-09-17 NOTE — DISCHARGE NOTE PROVIDER - NSDCCPCAREPLAN_GEN_ALL_CORE_FT
PRINCIPAL DISCHARGE DIAGNOSIS  Diagnosis: Chest pain  Assessment and Plan of Treatment:        PRINCIPAL DISCHARGE DIAGNOSIS  Diagnosis: Chest pain  Assessment and Plan of Treatment: Concern for for ischemic cart disease,. s/p cardiac cath with normal results      SECONDARY DISCHARGE DIAGNOSES  Diagnosis: Reactive airway disease  Assessment and Plan of Treatment: Follow with your pulmonologist    Diagnosis: Reactive airway disease, unspecified asthma severity, uncomplicated  Assessment and Plan of Treatment:     Diagnosis: Paroxysmal atrial fibrillation  Assessment and Plan of Treatment: Take your medications as prescribed    Diagnosis: HTN (hypertension)  Assessment and Plan of Treatment: Take your medications as prescribed

## 2021-09-17 NOTE — PROGRESS NOTE ADULT - SUBJECTIVE AND OBJECTIVE BOX
Department of Cardiology                                                                  [facility]/[place] [address] [address]                                                            Telephone: [phone]. Fax:[phone]                                                    Pre- Procedure Note: Left Heart Cardiac Catheterization      60 year old male pt.  with past medical history of Non ischemic dilated cardiomyopathy (LVEF 55-60% in 2021), Normal coronaries on cardiac catherization , Ventricular arrhythmias s/p ICD, Paroxysmal atrial fibrillation (s/p ablation, on Eliquis), Ascending aortic aneurysm(last measured 4.4 cm 1 week ago on CT [facility]) & airway Reactive disease/Asthma and history of pericarditis who presents with progressive dyspnea on minimal exertion since August. The patient reports prior to August he walked ~ 5 miles most days of the week and did not have any limiting symptoms, however since early August he has had dyspnea on minimal exertion.  Presents with c/o worsening MEDRANO and chest pressure with intermittent stabbing pain radiating to back a/w diaphoresis. Reports history of pericarditis > 1 year ago and having this few times in the past with unknown cause.   Troponins negative x 2  No EKG changes  Presents for R&LH for further evaluation of his coronary anatomy  No c/o CP overnight  	  MEDICATIONS  (STANDING):  albuterol/ipratropium for Nebulization 3 milliLiter(s) Nebulizer every 6 hours  atorvastatin 10 milliGRAM(s) Oral at bedtime  colchicine 0.6 milliGRAM(s) Oral daily  diltiazem    milliGRAM(s) Oral daily  mesalamine ER Capsule 1000 milliGRAM(s) Oral four times a day  omega-3-Acid Ethyl Esters 2 Gram(s) Oral two times a day  pantoprazole    Tablet 40 milliGRAM(s) Oral daily  senna 2 Tablet(s) Oral at bedtime  tamsulosin 0.4 milliGRAM(s) Oral at bedtime    MEDICATIONS  (PRN):  nitroglycerin     SubLingual 0.4 milliGRAM(s) SubLingual every 5 minutes PRN Chest Pain      ASA:  3  Mallampati: 2  Bleeding Risk: 0.8%  Creatinine: 1.03  GFR:  77      T(C): 37.2 (21 @ 07:56), Max: 37.2 (21 @ 16:20)  HR: 60 (21 @ 08:56) (60 - 92)  BP: 118/85 (21 @ 08:56) (113/77 - 130/87)  RR: 20 (21 @ 08:56) (16 - 20)  SpO2: 98% (21 @ 08:56) (95% - 99%)  Wt(kg): --    I&O's Summary      Daily Height in cm: 187.96 (16 Sep 2021 16:20)    Daily     Constitutional: A & O x 3  HEENT:   Normal oral mucosa, PERRL, EOMI	  Cardiovascular: Normal S1 S2, No JVD, No murmurs, No edema  Respiratory: Lungs clear to auscultation	  Gastrointestinal:  Soft, Non-tender, + BS, no pulsatile mass appreciated	  Skin: No rashes, No ecchymoses, No cyanosis  Neurologic: Non-focal  Extremities: No clubbing, cyanosis or edema  Vascular: Peripheral pulses palpable 2+ bilaterally    TELEMETRY: SB-SR	    ECG:  SB with nonspecific ST changes	  RADIOLOGY:   DIAGNOSTIC TESTING:  [ x] Echocardiogram: 2021 EF 55-60%, mildly increased LV size, no sig valvular disease  [x ]  Catheterization: < from: Cardiac Cath Lab - Adult (17 @ 15:40) >  [facility]  Department of Cardiology  [address] [place] [address]  [phone]  Cath Lab Report -- Comprehensive Report  Patient: SOTO EDWARDS  Study date: 2017  Account number: [id number]  MR number: [id number]  : 1960  Gender: Male  Race: W  Case Physician(s):  Zechariah Morton MD  Referring Physician:  Gullermo San Roman, MD Michael Mendelson, MD  INDICATIONS: Dilated cardiomyopathy.  HISTORY: History of Dilated CM. ICD in place.  PROCEDURE:  --  Left heart catheterization.  --  Left coronary angiography.  --  Right coronary angiography.  TECHNIQUE: The risks and alternatives of the procedures and conscious  sedation were explained to the patient and informed consent was obtained.  Cardiac catheterization performed electively.  Local anesthetic given. Right radial artery access. Left heart  catheterization. Left coronary artery angiography. The vessel was injected  utilizing a catheter. Right coronary artery angiography. The vessel was  injected utilizing a catheter. RADIATION EXPOSURE: 2.58 min.  CONTRAST GIVEN: Omnipaque 40 ml.  MEDICATIONS GIVEN: Verapamil (Isoptin, Calan, Covera), 5 mg, IA. Heparin,  3000 units, IA. 1% Lidocaine, 10 ml, subcutaneously. 0.9NS, 50 ml, IV.  HEMODYNAMICS: There is mild left sided failure.  CORONARY VESSELS: The coronary circulation is right dominant.  LM:   --  LM: Normal.  LAD:   --  LAD: Normal.  CX:   --  Circumflex: Normal.  RCA:   --  RCA: Normal.  COMPLICATIONS: No complications occurred duringthe cath lab visit.  DIAGNOSTIC IMPRESSIONS: The coronary anatomy is normal.  DIAGNOSTIC RECOMMENDATIONS: Assess and treat for possible pericarditis as  the cause of chest pain.  Prepared and signed by  Zechariah Morton MD  Signed 2017 15:55:30  HEMODYNAMIC TABLES  Pressures:  Baseline  Pressures:  - HR: 75  Pressures:  - Rhythm:  Pressures:  -- Aortic Pressure (S/D/M): 133/79/101  Pressures:  -- Left Ventricle (s/edp): 144/21/--  Outputs:  Baseline  Outputs:  -- CALCULATIONS[age]  Outputs:  -- CALCULATIONS: Body Surface Area: 2.30  Outputs:  -- CALCULATIONS: Height in cm: 183.00  Outputs:  -- CALCULATIONS: Sex: Male  Outputs:  -- CALCULATIONS: Weight in k.90    < end of copied text >    [ ] Stress Test:    OTHER: 	    LABS:    CARDIAC MARKERS ( 17 Sep 2021 03:45 )  CKx     / CKMBx     / Troponin T<0.01 ng/mL /  CARDIAC MARKERS ( 16 Sep 2021 17:34 )  CKx     / CKMBx     / Troponin T<0.01 ng/mL /                            12.8   8.35  )-----------( 143      ( 16 Sep 2021 17:34 )             39.7         137  |  97<L>  |  21.6<H>  ----------------------------<  103<H>  3.5   |  28.0  |  1.03    Ca    9.3      17 Sep 2021 03:45    TPro  6.8  /  Alb  4.4  /  TBili  0.9  /  DBili  x   /  AST  34  /  ALT  67<H>  /  AlkPhos  31<L>      proBNP: Serum Pro-Brain Natriuretic Peptide: 12 pg/mL ( @ 17:34)    ASSESSMENT: 61 y/o M presents with MEDRANO and atypical CP with h/o Non ischemic dilated cardiomyopathy (LVEF 55-60% in 2021), Normal coronaries on cardiac catherization , Ventricular arrhythmias s/p ICD, Paroxysmal atrial fibrillation (s/p ablation, on Eliquis), Ascending aortic aneurysm([facility] 4.4 [facility]) & airway Reactive disease/Asthma    -Last dose Eliquis  am  -ASA 81 mg given  -consent to be obtained  -procedure discussed with patient; risks and benefits explained, questions answered  -labs and ECG reviewed                                                                               Department of Cardiology                                                                  [facility]/[place] [address] [address]                                                            Telephone: [phone]. Fax:[phone]                                                    Pre- Procedure Note: Left Heart Cardiac Catheterization      60 year old male pt.  with past medical history of Non ischemic dilated cardiomyopathy (LVEF 55-60% in 2021), Normal coronaries on cardiac catherization , Ventricular arrhythmias s/p ICD, Paroxysmal atrial fibrillation (s/p ablation, on Eliquis), Ascending aortic aneurysm(last measured 4.4 cm 1 week ago on CT [facility]) & airway Reactive disease/Asthma and history of pericarditis who presents with progressive dyspnea on minimal exertion since August. The patient reports prior to August he walked ~ 5 miles most days of the week and did not have any limiting symptoms, however since early August he has had dyspnea on minimal exertion.  Presents with c/o worsening MEDRANO and chest pressure with intermittent stabbing pain radiating to back a/w diaphoresis. Reports history of pericarditis > 1 year ago and having this few times in the past with unknown cause.   Troponins negative x 2  No EKG changes  Presents for R&LH for further evaluation of his coronary anatomy  No c/o CP overnight  	  MEDICATIONS  (STANDING):  albuterol/ipratropium for Nebulization 3 milliLiter(s) Nebulizer every 6 hours  atorvastatin 10 milliGRAM(s) Oral at bedtime  colchicine 0.6 milliGRAM(s) Oral daily  diltiazem    milliGRAM(s) Oral daily  mesalamine ER Capsule 1000 milliGRAM(s) Oral four times a day  omega-3-Acid Ethyl Esters 2 Gram(s) Oral two times a day  pantoprazole    Tablet 40 milliGRAM(s) Oral daily  senna 2 Tablet(s) Oral at bedtime  tamsulosin 0.4 milliGRAM(s) Oral at bedtime    MEDICATIONS  (PRN):  nitroglycerin     SubLingual 0.4 milliGRAM(s) SubLingual every 5 minutes PRN Chest Pain      ASA:  3  Mallampati: 2  Bleeding Risk: 0.8%  Creatinine: 1.03  GFR:  77      T(C): 37.2 (21 @ 07:56), Max: 37.2 (21 @ 16:20)  HR: 60 (21 @ 08:56) (60 - 92)  BP: 118/85 (21 @ 08:56) (113/77 - 130/87)  RR: 20 (21 @ 08:56) (16 - 20)  SpO2: 98% (21 @ 08:56) (95% - 99%)  Wt(kg): --    I&O's Summary      Daily Height in cm: 187.96 (16 Sep 2021 16:20)    Daily     Constitutional: A & O x 3  HEENT:   Normal oral mucosa, PERRL, EOMI	  Cardiovascular: Normal S1 S2, No JVD, No murmurs, No edema  Respiratory: Lungs clear to auscultation	  Gastrointestinal:  Soft, Non-tender, + BS, no pulsatile mass appreciated	  Skin: No rashes, No ecchymoses, No cyanosis  Neurologic: Non-focal  Extremities: No clubbing, cyanosis or edema  Vascular: Peripheral pulses palpable 2+ bilaterally    TELEMETRY: SB-SR	    ECG:  SB with nonspecific ST changes	  RADIOLOGY:   DIAGNOSTIC TESTING:  [ x] Echocardiogram: 2021 EF 55-60%, mildly increased LV size, no sig valvular disease  [x ]  Catheterization: < from: Cardiac Cath Lab - Adult (17 @ 15:40) >  [facility]  Department of Cardiology  [address] [place] [address]  [phone]  Cath Lab Report -- Comprehensive Report  Patient: SOTO EDWARDS  Study date: 2017  Account number: [id number]  MR number: [id number]  : 1960  Gender: Male  Race: W  Case Physician(s):  Zechariah Morton MD  Referring Physician:  Gullermo San Roman, MD Michael Mendelson, MD  INDICATIONS: Dilated cardiomyopathy.  HISTORY: History of Dilated CM. ICD in place.  PROCEDURE:  --  Left heart catheterization.  --  Left coronary angiography.  --  Right coronary angiography.  TECHNIQUE: The risks and alternatives of the procedures and conscious  sedation were explained to the patient and informed consent was obtained.  Cardiac catheterization performed electively.  Local anesthetic given. Right radial artery access. Left heart  catheterization. Left coronary artery angiography. The vessel was injected  utilizing a catheter. Right coronary artery angiography. The vessel was  injected utilizing a catheter. RADIATION EXPOSURE: 2.58 min.  CONTRAST GIVEN: Omnipaque 40 ml.  MEDICATIONS GIVEN: Verapamil (Isoptin, Calan, Covera), 5 mg, IA. Heparin,  3000 units, IA. 1% Lidocaine, 10 ml, subcutaneously. 0.9NS, 50 ml, IV.  HEMODYNAMICS: There is mild left sided failure.  CORONARY VESSELS: The coronary circulation is right dominant.  LM:   --  LM: Normal.  LAD:   --  LAD: Normal.  CX:   --  Circumflex: Normal.  RCA:   --  RCA: Normal.  COMPLICATIONS: No complications occurred duringthe cath lab visit.  DIAGNOSTIC IMPRESSIONS: The coronary anatomy is normal.  DIAGNOSTIC RECOMMENDATIONS: Assess and treat for possible pericarditis as  the cause of chest pain.  Prepared and signed by  Zechariah Morton MD  Signed 2017 15:55:30  HEMODYNAMIC TABLES  Pressures:  Baseline  Pressures:  - HR: 75  Pressures:  - Rhythm:  Pressures:  -- Aortic Pressure (S/D/M): 133/79/101  Pressures:  -- Left Ventricle (s/edp): 144/21/--  Outputs:  Baseline  Outputs:  -- CALCULATIONS[age]  Outputs:  -- CALCULATIONS: Body Surface Area: 2.30  Outputs:  -- CALCULATIONS: Height in cm: 183.00  Outputs:  -- CALCULATIONS: Sex: Male  Outputs:  -- CALCULATIONS: Weight in k.90    < end of copied text >    [ ] Stress Test:    OTHER: 	    LABS:    CARDIAC MARKERS ( 17 Sep 2021 03:45 )  CKx     / CKMBx     / Troponin T<0.01 ng/mL /  CARDIAC MARKERS ( 16 Sep 2021 17:34 )  CKx     / CKMBx     / Troponin T<0.01 ng/mL /                            12.8   8.35  )-----------( 143      ( 16 Sep 2021 17:34 )             39.7         137  |  97<L>  |  21.6<H>  ----------------------------<  103<H>  3.5   |  28.0  |  1.03    Ca    9.3      17 Sep 2021 03:45    TPro  6.8  /  Alb  4.4  /  TBili  0.9  /  DBili  x   /  AST  34  /  ALT  67<H>  /  AlkPhos  31<L>      proBNP: Serum Pro-Brain Natriuretic Peptide: 12 pg/mL ( @ 17:34)    ASSESSMENT: 59 y/o M presents with MEDRANO and atypical CP with h/o Non ischemic dilated cardiomyopathy (LVEF 55-60% in 2021), Normal coronaries on cardiac catherization , Ventricular arrhythmias s/p ICD, Paroxysmal atrial fibrillation (s/p ablation, on Eliquis), Ascending aortic aneurysm(last measured 4.4 cm 1 week ago on CT [facility]) & airway Reactive disease/Asthma    -Last dose Eliquis  am  -ASA 81 mg given  -consent to be obtained  -procedure discussed with patient; risks and benefits explained, questions answered  -labs and ECG reviewed      ADDENDUM:    Pt is now S/P R&LHC by Dr Morton via RFA and RFV which revealed normal coronary arteries, normal RH pressures, low LVEDP and normal sized ascending aorta. RFA was closed with Mynx closure device. Pt tolerated procedure well and arrived to the recovery area HDS with RFV sheath in place. Denies any CP or SOB. No further cardiac testing recommended    Exam:  Awake and alert  Lungs CTA  Heart normal S1, S2  R groin soft, no oozing, no ecchymosis. RFV sheath removed and manual pressure applied for >15 mins with hemostasis achieved. Distal pulses 2+    Pt educated on groin precautions  Resume Eliquis  Cont current medical therapy  Follow up with pulmonary and Dr Hector after discharge  Discharge home today  Discussed with Dr Downey

## 2021-09-17 NOTE — DISCHARGE NOTE PROVIDER - NSDCMRMEDTOKEN_GEN_ALL_CORE_FT
Cardizem  mg/24 hours oral capsule, extended release: 1 cap(s) orally once a day  colchicine 0.6 mg oral tablet: 1 tab(s) orally 2 times a day  Eliquis 5 mg oral tablet: 1 tab(s) orally 2 times a day. Resume 9/17 pm  Flomax 0.4 mg oral capsule: 1 cap(s) orally once a day  hydroCHLOROthiazide 12.5 mg oral tablet: 1 tab(s) orally , As Needed  Lipitor 10 mg oral tablet: 1 tab(s) orally once a day  Lovaza 1000 mg oral capsule: 4 tab(s) orally once a day  montelukast 10 mg oral tablet: 1 tab(s) orally once a day  Pentasa: 1000 milligram(s) orally 4 times a day  raNITIdine 150 mg oral capsule:  orally once a day (at bedtime)  Symbicort 160 mcg-4.5 mcg/inh inhalation aerosol: 2 puff(s) inhaled 2 times a day  valsartan 160 mg oral capsule:  orally 2 times a day

## 2021-09-17 NOTE — DISCHARGE NOTE PROVIDER - NSDCFUSCHEDAPPT_GEN_ALL_CORE_FT
SOTO EDWARDS ; 09/30/2021 ; [facility] Cardiology [address]  SOTO EDWARDS ; 10/02/2021 ; NPP DisEmerg [address] [facility]  SOTO EDWARDS ; 10/06/2021 ; NPP PulmMed [address]  SOTO EDWARDS ; 10/06/2021 ; [facility] PulmMed [address]

## 2021-09-17 NOTE — DISCHARGE NOTE PROVIDER - NSDCCPTREATMENT_GEN_ALL_CORE_FT
PRINCIPAL PROCEDURE  Procedure: Left and right heart catheterization  Findings and Treatment: normal coronary arteries; normal right heart pressures

## 2021-09-17 NOTE — DISCHARGE NOTE PROVIDER - HOSPITAL COURSE
60 year old male pt.  with past medical history of Non ischemic dilated cardiomyopathy (LVEF 55-60% in 6/2021), Normal coronaries on cardiac catherization 2017, Ventricular arrhythmias s/p ICD, Paroxysmal atrial fibrillation (s/p ablation, on Eliquis), Ascending aortic aneurysm[facility].4 [facility] & airway Reactive disease/Asthma and history of pericarditis who presents with progressive dyspnea on minimal exertion since August. The patient reports prior to August he walked ~ 5 miles most days of the week and did not have any limiting symptoms, however since early August he has had dyspnea on minimal exertion.  Presents with c/o worsening MEDRANO and chest pressure with intermittent stabbing pain radiating to back a/w diaphoresis. Reports history of pericarditis > 1 year ago and having this few times in the past with unknown cause.   Troponins negative x 2  No EKG changes  Presents for R&LHC for further evaluation of his coronary anatomyS/P R&LHC by Dr Morton via RFA and RFV which revealed normal coronary arteries, normal RH pressures, low LVEDP and normal sized ascending aorta. RFA was closed with Mynx closure device. Pt tolerated procedure well and arrived to the recovery area HDS with RFV sheath in place. Denies any CP or SOB. No further cardiac testing recommended  Pt tolerating diet and ambulating without difficulty  Stable for discharge  60 year old male pt.  with past medical history of Non ischemic dilated cardiomyopathy (LVEF 55-60% in 6/2021), Normal coronaries on cardiac catherization 2017, Ventricular arrhythmias s/p ICD, Paroxysmal atrial fibrillation (s/p ablation, on Eliquis), Ascending aortic aneurysm(last measured 4.4 cm 1 week ago on [facility]) & airway Reactive disease/Asthma and history of pericarditis who presents with progressive dyspnea on minimal exertion since August. The patient reports prior to August he walked ~ 5 miles most days of the week and did not have any limiting symptoms, however since early August he has had dyspnea on minimal exertion.  Presents with c/o worsening MEDRANO and chest pressure with intermittent stabbing pain radiating to back a/w diaphoresis. Reports history of pericarditis > 1 year ago and having this few times in the past with unknown cause.   Troponins negative x 2  No EKG changes  Presents for R&LHC for further evaluation of his coronary anatomyS/P R&LHC by Dr Morton via RFA and RFV which revealed normal coronary arteries, normal RH pressures, low LVEDP and normal sized ascending aorta. RFA was closed with Mynx closure device. Pt tolerated procedure well and arrived to the recovery area HDS with RFV sheath in place. Denies any CP or SOB. No further cardiac testing recommended  Pt tolerating diet and ambulating without difficulty  Stable for discharge  He has appointments set to follow with pulmonologist for PFT and sleep studies

## 2021-09-17 NOTE — DISCHARGE NOTE PROVIDER - CARE PROVIDER_API CALL
Adonis Hector)  Cardiovascular Disease; Internal Medicine  [address] [place] [address]  Phone: [phone]  Fax: [phone]  Follow Up Time:

## 2021-09-18 PROBLEM — K50.90 CROHN'S DISEASE, UNSPECIFIED, WITHOUT COMPLICATIONS: Chronic | Status: ACTIVE | Noted: 2021-09-17

## 2021-09-22 ENCOUNTER — APPOINTMENT (OUTPATIENT)
Dept: PULMONOLOGY | Facility: CLINIC | Age: 61
End: 2021-09-22

## 2021-09-22 ENCOUNTER — APPOINTMENT (OUTPATIENT)
Age: 61
End: 2021-09-22
Payer: MEDICARE

## 2021-09-22 ENCOUNTER — OUTPATIENT (OUTPATIENT)
Dept: OUTPATIENT SERVICES | Facility: HOSPITAL | Age: 61
LOS: 1 days | End: 2021-09-22
Payer: MEDICARE

## 2021-09-22 DIAGNOSIS — Z90.49 ACQUIRED ABSENCE OF OTHER SPECIFIED PARTS OF DIGESTIVE TRACT: Chronic | ICD-10-CM

## 2021-09-22 DIAGNOSIS — Z95.810 PRESENCE OF AUTOMATIC (IMPLANTABLE) CARDIAC DEFIBRILLATOR: Chronic | ICD-10-CM

## 2021-09-22 DIAGNOSIS — G47.33 OBSTRUCTIVE SLEEP APNEA (ADULT) (PEDIATRIC): ICD-10-CM

## 2021-09-22 PROCEDURE — 95810 POLYSOM 6/> YRS 4/> PARAM: CPT

## 2021-09-22 PROCEDURE — 95810 POLYSOM 6/> YRS 4/> PARAM: CPT | Mod: 26

## 2021-09-23 ENCOUNTER — NON-APPOINTMENT (OUTPATIENT)
Age: 61
End: 2021-09-23

## 2021-09-23 ENCOUNTER — TRANSCRIPTION ENCOUNTER (OUTPATIENT)
Age: 61
End: 2021-09-23

## 2021-09-27 ENCOUNTER — RX RENEWAL (OUTPATIENT)
Age: 61
End: 2021-09-27

## 2021-09-29 ENCOUNTER — RESULT CHARGE (OUTPATIENT)
Age: 61
End: 2021-09-29

## 2021-09-30 ENCOUNTER — NON-APPOINTMENT (OUTPATIENT)
Age: 61
End: 2021-09-30

## 2021-09-30 ENCOUNTER — APPOINTMENT (OUTPATIENT)
Dept: CARDIOLOGY | Facility: CLINIC | Age: 61
End: 2021-09-30
Payer: MEDICARE

## 2021-09-30 VITALS
SYSTOLIC BLOOD PRESSURE: 116 MMHG | RESPIRATION RATE: 16 BRPM | BODY MASS INDEX: 28.75 KG/M2 | HEART RATE: 74 BPM | HEIGHT: 74 IN | WEIGHT: 224 LBS | DIASTOLIC BLOOD PRESSURE: 80 MMHG

## 2021-09-30 VITALS — OXYGEN SATURATION: 96 %

## 2021-09-30 PROCEDURE — 93000 ELECTROCARDIOGRAM COMPLETE: CPT

## 2021-09-30 PROCEDURE — 99214 OFFICE O/P EST MOD 30 MIN: CPT

## 2021-09-30 NOTE — HISTORY OF PRESENT ILLNESS
[FreeTextEntry1] : Patients Pulmonologist (Dr. Vega) is suspecting he has an element of restrictive airway disease with asthma / bronchiectasis / bronchiolitis.  Mr. Giles does report working at [employer] for many years where he believes he may have been exposed to (asbestos); ?asbestosis;\par \par Has follow up with his Pulmonologist next week for PFT's and discuss management;\par \par Left Heart Catheterization (9/17/2021):  Normal coronary vasculature throughout.  Normal right heart pressures with no evidence of constrictive physiology. Normal LVEDP;\par \par Transthoracic Echocardiogram (9/8/2021):  Ascending aorta mildly dilated (3.93 cm).  Normal LV systolic function with an LVEF of 55 to 60%.  The left and right atria normal in size and structure.  There was NO significant valvular insufficiency noted.  There was NO evidence of pericardial effusion;

## 2021-09-30 NOTE — DISCUSSION/SUMMARY
[FreeTextEntry1] : 1. Exacerbation of reactive airway disease/asthma: Patient presents today again with hoarseness in his voice and on auscultation, decrease airflow at the bases bilaterally. Slight wheezing noted at apexes. Pulse oximetry on root air 96%. The patient continues to complain of dyspnea on exertion and fatigue, but slightly improved. Currently under the care of Dr. Vega, a pulmonologist he saw for second opinion. I do not feel that his current status is cardiac in any way given multiple echoes demonstrating normal LV function as well as normal coronaries and normal RV pressures / normal LVEDP on cardiac cath couple weeks ago.\par \par Follow up with Dr. Vega next week for PFT's and discuss management for possible airway disease    ?asbestosis ?reactive airway disease\par \par 3. Given the above, will continue diltiazem  mg daily in replacement of Metoprolol. There may also be an element of anxiety in this particular setting. He is to continue Alprazolam 0.25 mg b.i.d. p.r.n. \par \par 4).  No additional cardiac testing indicated at this time. \par \par 5).  Recommend patient report any untoward symptoms. \par \par 6).  Follow up with Dr. Hector in 6 weeks or PRN.

## 2021-09-30 NOTE — ASSESSMENT
[FreeTextEntry1] : EKG 9/30/2021:  The EKG illustrates sinus rhythm, rate of 74 bpm, LVH pattern with left axis, nonspecific T wave abnormality.  Essentially unchanged.\par \par

## 2021-09-30 NOTE — PHYSICAL EXAM
[Well Developed] : well developed [Well Nourished] : well nourished [No Acute Distress] : no acute distress [Normal Conjunctiva] : normal conjunctiva [Normal Venous Pressure] : normal venous pressure [No Carotid Bruit] : no carotid bruit [Normal S1, S2] : normal S1, S2 [No Murmur] : no murmur [No Rub] : no rub [No Gallop] : no gallop [No Respiratory Distress] : no respiratory distress  [Wheeze ____] : wheeze [unfilled] [Soft] : abdomen soft [Non Tender] : non-tender [No Masses/organomegaly] : no masses/organomegaly [Normal Gait] : normal gait [No Edema] : no edema [No Cyanosis] : no cyanosis [No Clubbing] : no clubbing [No Rash] : no rash [No Skin Lesions] : no skin lesions [Moves all extremities] : moves all extremities [No Focal Deficits] : no focal deficits [Normal Speech] : normal speech [Alert and Oriented] : alert and oriented [Normal memory] : normal memory [de-identified] : + Hoarseness [de-identified] : decreased breath sounds at bases bilaterally

## 2021-09-30 NOTE — REASON FOR VISIT
[FreeTextEntry1] : Mr. Giles is a pleasant 60-year-old white male with a past medical history significant for non-ischemic/dilated cardiomyopathy with normal coronary arteries noted on cardiac catheterization in 2017, patient status-post AICD implantation for symptomatic ventricular arrhythmia as well as paroxysmal atrial fibrillation for which he is status-post ablation, and lastly, reactive airway disease/asthma with multiple acute exacerbations these last few weeks.\par \par Patient went to [facility] 3 x in the month of August for c/o shortness of breath, found to have what seemed to be acute on chronic airway disease exacerbation.  He also had updated echocardiogram and left heart catheterization recently and back today to review those results.\par \par He and his wife present today in office, continues to c/o of dyspnea on exertion but admits it seems slightly better since follow up with Pulmonologist (Dr. Vega) and has begun taking Breo inhaler.\par \par Patient denies substernal CP, PND, orthopnea, palpitations, presyncope, syncope, edema;

## 2021-10-01 DIAGNOSIS — Z01.818 ENCOUNTER FOR OTHER PREPROCEDURAL EXAMINATION: ICD-10-CM

## 2021-10-02 ENCOUNTER — APPOINTMENT (OUTPATIENT)
Dept: DISASTER EMERGENCY | Facility: CLINIC | Age: 61
End: 2021-10-02

## 2021-10-03 LAB — SARS-COV-2 N GENE NPH QL NAA+PROBE: NOT DETECTED

## 2021-10-06 ENCOUNTER — APPOINTMENT (OUTPATIENT)
Dept: PULMONOLOGY | Facility: CLINIC | Age: 61
End: 2021-10-06
Payer: MEDICARE

## 2021-10-06 VITALS
RESPIRATION RATE: 16 BRPM | OXYGEN SATURATION: 99 % | SYSTOLIC BLOOD PRESSURE: 120 MMHG | DIASTOLIC BLOOD PRESSURE: 60 MMHG | HEART RATE: 87 BPM

## 2021-10-06 VITALS — BODY MASS INDEX: 30.22 KG/M2 | WEIGHT: 228 LBS | HEIGHT: 73 IN

## 2021-10-06 PROCEDURE — 99215 OFFICE O/P EST HI 40 MIN: CPT

## 2021-10-06 NOTE — DISCUSSION/SUMMARY
[FreeTextEntry1] : \par #1. PFTs performed today are essentially normal with a mildly reduced FVC and TLC\par #2. The patient does not appear to require chronic BD therapy at this time but is on Breo per Dr. Mendelson \par #3. SOBOE is likely related to weight or deconditioning given normal PFTs\par #4. Diet and exercise for weight loss \par #5. Start autoCPAP to treat severe ABBIE with an AHI of 42.1; encouraged compliance \par #6. F/u one month after starting CPAP therapy with compliance  \par #7. Prior lung imaging studies were unremarkable\par #8. D/w cardiology for possible cardiac cath for possible CAD though prior cath in 2017 was reportedly unremarkable\par #9. Pt had both Covid vaccines \par #10. Reviewed risks of exposure and symptoms of Covid-19 virus, including how the virus is spread and precautions to avoid alessia virus.\par \par Patient's questions were answered and patient appears to understand these recommendations \par d/w PCP/pulm previously

## 2021-10-06 NOTE — HISTORY OF PRESENT ILLNESS
[Former] : former [Never] : never [Excessive Daytime Sleepiness] : excessive daytime sleepiness [Witnessed Apnea During Sleep] : witnessed apnea during sleep [Witnessed Gasping During Sleep] : witnessed gasping during sleep [Snoring] : snoring [Unrefreshing Sleep] : unrefreshing sleep [Sleepy When Sedentary] : sleepy when sedentary [None] : The patient is not currently being treated for this problem [Follow-Up - Routine Clinic] : a routine clinic follow-up of [Excess Weight] : excess weight [Currently Experiencing] : The patient is currently experiencing symptoms. [Dyspnea] : dyspnea [Low Calorie Diet] : low calorie diet [Fair Compliance] : fair compliance with treatment [Fair Tolerance] : fair tolerance of treatment [Poor Symptom Control] : poor symptom control [Sleep Apnea] : sleep apnea [High] : high [Low Calorie] : low calorie [Well Balanced Diet] : well balanced meals [Infrequently] : exercises infrequently [Walking] : walking [TextBox_4] : The patient gives a h/o possible asthma vs COPD or reactive airways disease for which he has been maintained on Symbicort on Montelukast therapy. \par He is followed with cardiology for reported nonischemic CM though now with normal EF, s/p AICD for reported ventricular arrhythmia, and h/o pericarditis for which he is on Colchicine. \par He reports that he normal can walk up to 6 miles daily but over the past 6 weeks he is having difficulty walking even a few blocks. He was seen by his PCP/pulm, cardiology and even was admitted to [facility] from 8/30/21-9/3/21 but w/u including CXR and CTA were unremarkable. He was d/c'd as a COPD exacerbation. [TextBox_11] : 1.5 [TextBox_13] : 4 [YearQuit] : 1980

## 2021-10-06 NOTE — CONSULT LETTER
[Dear  ___] : Dear  [unfilled], [Consult Letter:] : I had the pleasure of evaluating your patient, [unfilled]. [Please see my note below.] : Please see my note below. [Consult Closing:] : Thank you very much for allowing me to participate in the care of this patient.  If you have any questions, please do not hesitate to contact me. [Sincerely,] : Sincerely, [DrIvan  ___] : Dr. HARRIS [FreeTextEntry3] : Romain Vega MD, FCCP, D. ABSM\par Pulmonary and Sleep Medicine\par [facility] at [place]

## 2021-10-06 NOTE — PROCEDURE
[FreeTextEntry1] : PFTs 10/6/21 - Mildly reduced FVC and normal FEV1 without obstruction with mildly reduced lung volumes and normal diffusion capacity

## 2021-10-06 NOTE — REASON FOR VISIT
[Shortness of Breath] : shortness of breath [Asthma] : asthma [Sleep Apnea] : sleep apnea [Obesity] : obesity [Follow-Up] : a follow-up visit

## 2021-10-25 ENCOUNTER — RX RENEWAL (OUTPATIENT)
Age: 61
End: 2021-10-25

## 2021-10-29 ENCOUNTER — RX RENEWAL (OUTPATIENT)
Age: 61
End: 2021-10-29

## 2021-11-02 ENCOUNTER — RX RENEWAL (OUTPATIENT)
Age: 61
End: 2021-11-02

## 2021-11-18 ENCOUNTER — APPOINTMENT (OUTPATIENT)
Dept: CARDIOLOGY | Facility: CLINIC | Age: 61
End: 2021-11-18
Payer: MEDICARE

## 2021-11-18 ENCOUNTER — NON-APPOINTMENT (OUTPATIENT)
Age: 61
End: 2021-11-18

## 2021-11-18 VITALS
DIASTOLIC BLOOD PRESSURE: 81 MMHG | HEIGHT: 73 IN | HEART RATE: 87 BPM | WEIGHT: 235 LBS | RESPIRATION RATE: 16 BRPM | BODY MASS INDEX: 31.14 KG/M2 | SYSTOLIC BLOOD PRESSURE: 126 MMHG | OXYGEN SATURATION: 95 %

## 2021-11-18 PROCEDURE — 99214 OFFICE O/P EST MOD 30 MIN: CPT

## 2021-11-18 PROCEDURE — 93000 ELECTROCARDIOGRAM COMPLETE: CPT

## 2021-11-18 RX ORDER — FAMOTIDINE 20 MG/1
20 TABLET, FILM COATED ORAL
Refills: 0 | Status: DISCONTINUED | COMMUNITY
End: 2021-11-18

## 2021-11-18 RX ORDER — BUDESONIDE AND FORMOTEROL FUMARATE DIHYDRATE 160; 4.5 UG/1; UG/1
160-4.5 AEROSOL RESPIRATORY (INHALATION) TWICE DAILY
Qty: 1 | Refills: 5 | Status: DISCONTINUED | COMMUNITY
Start: 2020-04-13 | End: 2021-11-18

## 2021-11-18 RX ORDER — TAMSULOSIN HYDROCHLORIDE 0.4 MG/1
0.4 CAPSULE ORAL
Refills: 0 | Status: DISCONTINUED | COMMUNITY
End: 2021-11-18

## 2021-11-18 RX ORDER — BENZONATATE 100 MG/1
100 CAPSULE ORAL
Refills: 0 | Status: DISCONTINUED | COMMUNITY
End: 2021-11-18

## 2021-11-18 NOTE — PHYSICAL EXAM
[Well Developed] : well developed [Well Nourished] : well nourished [No Acute Distress] : no acute distress [Obese] : obese [Normal Conjunctiva] : normal conjunctiva [Normal Venous Pressure] : normal venous pressure [No Carotid Bruit] : no carotid bruit [Normal S1, S2] : normal S1, S2 [No Murmur] : no murmur [No Rub] : no rub [No Gallop] : no gallop [No Respiratory Distress] : no respiratory distress  [Wheeze ____] : wheeze [unfilled] [Soft] : abdomen soft [Normal Bowel Sounds] : normal bowel sounds [Normal Gait] : normal gait [No Edema] : no edema [No Rash] : no rash [No Skin Lesions] : no skin lesions [Moves all extremities] : moves all extremities [No Focal Deficits] : no focal deficits [Normal Speech] : normal speech [Alert and Oriented] : alert and oriented [Normal memory] : normal memory

## 2021-11-19 ENCOUNTER — APPOINTMENT (OUTPATIENT)
Dept: PULMONOLOGY | Facility: CLINIC | Age: 61
End: 2021-11-19
Payer: MEDICARE

## 2021-11-19 VITALS
WEIGHT: 235 LBS | BODY MASS INDEX: 31 KG/M2 | SYSTOLIC BLOOD PRESSURE: 130 MMHG | HEART RATE: 89 BPM | OXYGEN SATURATION: 97 % | DIASTOLIC BLOOD PRESSURE: 84 MMHG

## 2021-11-19 PROCEDURE — 99214 OFFICE O/P EST MOD 30 MIN: CPT

## 2021-11-19 NOTE — REASON FOR VISIT
[Follow-Up] : a follow-up visit [Asthma] : asthma [Sleep Apnea] : sleep apnea [Shortness of Breath] : shortness of breath [Obesity] : obesity

## 2021-11-19 NOTE — CONSULT LETTER
[address]  Heart Failure Clinic Progress Note    Terald Severe is a 78year old female who presents to clinic for APN assessment and management of  HFpEF/RV heart failure and is functional class 3.     Subjective:  Maurilio Ramirez returns for assessment of vo [Dear  ___] : Dear  [unfilled], [Consult Letter:] : I had the pleasure of evaluating your patient, [unfilled]. a week. Mon, Wed, Fri Disp:  Rfl:    Cholecalciferol (VITAMIN D3) 09889 units Oral Cap Take by mouth once a week. Disp:  Rfl:    AmLODIPine Besylate 5 MG Oral Tab Take 5 mg by mouth 2 (two) times daily.  Disp:  Rfl:      No current facility-administered med [Please see my note below.] : Please see my note below. spent 30 minutes with this patient and her daughter providing counseling, coordination of care and education related specifically to heart failure.     Magaly Uribe NP   11/8/2018  12:46 PM [Consult Closing:] : Thank you very much for allowing me to participate in the care of this patient.  If you have any questions, please do not hesitate to contact me. [Sincerely,] : Sincerely, [DrIvan  ___] : Dr. HARRIS [FreeTextEntry3] : Romain Vega MD, FCCP, D. ABSM\par Pulmonary and Sleep Medicine\par [facility]monary and Sleep Medicine at [place]

## 2021-11-19 NOTE — HISTORY OF PRESENT ILLNESS
[Former] : former [Never] : never [Follow-Up - Routine Clinic] : a routine clinic follow-up of [Excess Weight] : excess weight [Currently Experiencing] : The patient is currently experiencing symptoms. [Dyspnea] : dyspnea [Low Calorie Diet] : low calorie diet [Fair Compliance] : fair compliance with treatment [Fair Tolerance] : fair tolerance of treatment [Sleep Apnea] : sleep apnea [Poor Symptom Control] : poor symptom control [High] : high [Low Calorie] : low calorie [Well Balanced Diet] : well balanced meals [Infrequently] : exercises infrequently [Walking] : walking [TextBox_4] : The patient gives a h/o possible asthma vs COPD or reactive airways disease for which he has been maintained on Symbicort on Montelukast therapy. \par He is followed with cardiology for reported nonischemic CM though now with normal EF, s/p AICD for reported ventricular arrhythmia, and h/o pericarditis for which he is on Colchicine. \par He reports that he normal can walk up to 6 miles daily but over the past 6 weeks he is having difficulty walking even a few blocks. He was seen by his PCP/pulm, cardiology and even was admitted to [facility] from 8/30/21-9/3/21 but w/u including CXR and CTA were unremarkable. He was d/c'd as a COPD exacerbation.\par He is a former smoker of 1 ppd x 4 years, quit 1984. [TextBox_11] : 1.5 [TextBox_13] : 4 [YearQuit] : 1984 [None] : The patient is currently asymptomatic [Good Compliance] : good compliance with treatment [Good Tolerance] : good tolerance of treatment [Good Symptom Control] : good symptom control [de-identified] : AutoCPAP

## 2021-11-19 NOTE — DISCUSSION/SUMMARY
[FreeTextEntry1] : \par #1. PFTs performed today are essentially normal with a mildly reduced FVC and TLC\par #2. The patient does not appear to require chronic BD therapy at this time but is on Breo per Dr. Mendelson \par #3. SOBOE is likely related to weight or deconditioning given normal PFTs\par #4. Diet and exercise for weight loss \par #5. Continue autoCPAP to treat severe ABBIE with an AHI of 42.1; encouraged compliance; The patient is using and benefitting from CPAP therapy \par #6. F/u 4 months with compliance  \par #7. Prior lung imaging studies were unremarkable\par #8. D/w cardiology for possible cardiac cath for possible CAD though prior cath in 2017 was reportedly unremarkable\par #9. Pt had both Covid vaccines and booster; s/p flu vaccine\par #10. Reviewed risks of exposure and symptoms of Covid-19 virus, including how the virus is spread and precautions to avoid alessia virus.\par \par Patient's questions were answered and patient appears to understand these recommendations \par d/w pulm and PCP previously

## 2021-11-21 NOTE — REASON FOR VISIT
[FreeTextEntry1] : Mr. Giles is a pleasant 60-year-old white male with a past medical history significant for non-ischemic/dilated cardiomyopathy with normal coronary arteries noted on most recent cardiac catheterization in September 2021, patient is status-post AICD implantation for symptomatic ventricular arrhythmia as well as paroxysmal atrial fibrillation for which he is status-post ablation, and lastly, reactive airway disease/ asthma, who presents for follow up evaluation.

## 2021-11-21 NOTE — HISTORY OF PRESENT ILLNESS
[FreeTextEntry1] : Mr. Giles presents today feeling "great."  He is presently without complaints of chest pain, shortness of breath, palpitations, lightheadedness or syncope.   Recently underwent pulmonary rehab and has been feelining much better.  He also started using a CPAP machine for his sleep apnea and feels it is working well.  Now that he is feeling better, he would like to start exercising again and increase his walking.

## 2021-11-21 NOTE — CARDIOLOGY SUMMARY
[de-identified] : Sinus rhythm at 83 bpm.  Poor R-wave progression V1-V3.  No acute ischemic changes.

## 2021-11-21 NOTE — DISCUSSION/SUMMARY
[FreeTextEntry1] : Dr. Hector in to speak with the patient.\par \par 1 - Hypertension:  blood pressure well controlled on current  medications.  Advised to follow strict low sodium diet.\par \par 2 - Exacerbation of reactive airway disease/asthma:  Mr. Giles underwent pulmonary rehab and states that he is feeling great at this time.  No further shortness of breath.  Continue to follow up with Dr. Vega.\par \par 3 - Paroxysmal atrial fibrillation:  Today's EKG reveals Sinus rhythm at 83 bpm.  Poor R-wave progression V1-V3.  No acute ischemic changes.  Tolerating Eliquis 5mg BID well.\par \par 4 - Dilated cardiomyopathy:  Clinically stable at this time.   AICD is followed by Dr. Preston Vicente at [place].\par \par 5 - Hyperlipidemia:  continue with Atorvastatin 10mg, Lovaza 4gm daily.  Fasting blood work prior to follow up visit.\par \par 6 - Follow up with Dr. Hector in 6 months.

## 2021-12-08 ENCOUNTER — APPOINTMENT (OUTPATIENT)
Dept: CARDIOLOGY | Facility: CLINIC | Age: 61
End: 2021-12-08

## 2021-12-27 ENCOUNTER — RX RENEWAL (OUTPATIENT)
Age: 61
End: 2021-12-27

## 2022-01-19 ENCOUNTER — APPOINTMENT (RX ONLY)
Dept: URBAN - METROPOLITAN AREA CLINIC 124 | Facility: CLINIC | Age: 62
Setting detail: DERMATOLOGY
End: 2022-01-19

## 2022-01-19 DIAGNOSIS — L82.0 INFLAMED SEBORRHEIC KERATOSIS: ICD-10-CM

## 2022-01-19 DIAGNOSIS — D18.0 HEMANGIOMA: ICD-10-CM

## 2022-01-19 DIAGNOSIS — L90.5 SCAR CONDITIONS AND FIBROSIS OF SKIN: ICD-10-CM

## 2022-01-19 PROBLEM — D18.01 HEMANGIOMA OF SKIN AND SUBCUTANEOUS TISSUE: Status: ACTIVE | Noted: 2022-01-19

## 2022-01-19 PROCEDURE — ? LIQUID NITROGEN

## 2022-01-19 PROCEDURE — ? COUNSELING

## 2022-01-19 PROCEDURE — 17110 DESTRUCTION B9 LES UP TO 14: CPT

## 2022-01-19 PROCEDURE — 99213 OFFICE O/P EST LOW 20 MIN: CPT | Mod: 25

## 2022-01-19 PROCEDURE — ? DIAGNOSIS COMMENT

## 2022-01-19 ASSESSMENT — LOCATION SIMPLE DESCRIPTION DERM
LOCATION SIMPLE: RIGHT CHEEK
LOCATION SIMPLE: LEFT ANTERIOR NECK
LOCATION SIMPLE: RIGHT ANTERIOR NECK

## 2022-01-19 ASSESSMENT — LOCATION ZONE DERM
LOCATION ZONE: NECK
LOCATION ZONE: FACE

## 2022-01-19 ASSESSMENT — LOCATION DETAILED DESCRIPTION DERM
LOCATION DETAILED: RIGHT INFERIOR LATERAL NECK
LOCATION DETAILED: LEFT CLAVICULAR NECK
LOCATION DETAILED: RIGHT MEDIAL MALAR CHEEK

## 2022-01-19 NOTE — HPI: SKIN LESION
What Type Of Note Output Would You Prefer (Optional)?: Standard Output
Is This A New Presentation, Or A Follow-Up?: Skin Lesion
Additional History: Patient had biopsy at [facility]. He is in for a second opinion based on pathology report. He also has 2 spots on his neck that he would like checked.

## 2022-01-19 NOTE — PROCEDURE: LIQUID NITROGEN
Spray Paint Technique: No
Show Spray Paint Technique Variable?: Yes
Medical Necessity Clause: This procedure was medically necessary because the lesions that were treated were:
Detail Level: Detailed
Post-Care Instructions: I reviewed with the patient in detail post-care instructions. Patient is to wear sunprotection, and avoid picking at any of the treated lesions. Pt may apply Vaseline to crusted or scabbing areas.
Consent: The patient's consent was obtained including but not limited to risks of crusting, scabbing, blistering, scarring, darker or lighter pigmentary change, recurrence, incomplete removal and infection.
Spray Paint Text: The liquid nitrogen was applied to the skin utilizing a spray paint frosting technique.
Medical Necessity Information: It is in your best interest to select a reason for this procedure from the list below. All of these items fulfill various CMS LCD requirements except the new and changing color options.
Number Of Freeze-Thaw Cycles: 3 freeze-thaw cycles

## 2022-01-19 NOTE — PROCEDURE: COUNSELING
[Person] : oriented to person
[Place] : oriented to place
Detail Level: Detailed
[Time] : oriented to time
[Concentration Intact] : normal concentrating ability
[Comprehension] : comprehension intact
[Cranial Nerves Optic (II)] : visual acuity intact bilaterally,  visual fields full to confrontation, pupils equal round and reactive to light
[Cranial Nerves Oculomotor (III)] : extraocular motion intact
[Cranial Nerves Trigeminal (V)] : facial sensation intact symmetrically
[Cranial Nerves Facial (VII)] : face symmetrical
[Cranial Nerves Vestibulocochlear (VIII)] : hearing was intact bilaterally
[Cranial Nerves Glossopharyngeal (IX)] : tongue and palate midline
[Cranial Nerves Accessory (XI - Cranial And Spinal)] : head turning and shoulder shrug symmetric
Detail Level: Simple
[Cranial Nerves Hypoglossal (XII)] : there was no tongue deviation with protrusion
[Motor Strength] : muscle strength was normal in all four extremities
[Involuntary Movements] : no involuntary movements were seen
[Paresis Pronator Drift Right-Sided] : no pronator drift on the right
[Paresis Pronator Drift Left-Sided] : no pronator drift on the left
[Sensation Tactile Decrease] : light touch was intact
[Coordination - Dysmetria Impaired Finger-to-Nose Bilateral] : not present
[FreeTextEntry1] : Mild right orbicularis oris spasm seen after injection.\par \par Patient provided a video with prominent right blepharospasms and spasms at the corner of the right mouth, in addition to nasalis.
[FreeTextEntry8] : Patient ambulates with cane, wide based.

## 2022-01-19 NOTE — PROCEDURE: DIAGNOSIS COMMENT
Comment: Well healed biopsy site from outside derm. Path report scanned into chart. Biopsy is benign. No further treatment is needed.
Render Risk Assessment In Note?: yes
Detail Level: Simple

## 2022-01-31 ENCOUNTER — RX RENEWAL (OUTPATIENT)
Age: 62
End: 2022-01-31

## 2022-03-10 ENCOUNTER — APPOINTMENT (RX ONLY)
Dept: URBAN - METROPOLITAN AREA CLINIC 124 | Facility: CLINIC | Age: 62
Setting detail: DERMATOLOGY
End: 2022-03-10

## 2022-03-10 DIAGNOSIS — D22 MELANOCYTIC NEVI: ICD-10-CM

## 2022-03-10 DIAGNOSIS — Z71.89 OTHER SPECIFIED COUNSELING: ICD-10-CM

## 2022-03-10 DIAGNOSIS — L81.4 OTHER MELANIN HYPERPIGMENTATION: ICD-10-CM

## 2022-03-10 DIAGNOSIS — L82.1 OTHER SEBORRHEIC KERATOSIS: ICD-10-CM

## 2022-03-10 DIAGNOSIS — D18.0 HEMANGIOMA: ICD-10-CM

## 2022-03-10 DIAGNOSIS — L82.0 INFLAMED SEBORRHEIC KERATOSIS: ICD-10-CM

## 2022-03-10 DIAGNOSIS — D49.2 NEOPLASM OF UNSPECIFIED BEHAVIOR OF BONE, SOFT TISSUE, AND SKIN: ICD-10-CM

## 2022-03-10 PROBLEM — D22.5 MELANOCYTIC NEVI OF TRUNK: Status: ACTIVE | Noted: 2022-03-10

## 2022-03-10 PROBLEM — D18.01 HEMANGIOMA OF SKIN AND SUBCUTANEOUS TISSUE: Status: ACTIVE | Noted: 2022-03-10

## 2022-03-10 PROCEDURE — 17110 DESTRUCTION B9 LES UP TO 14: CPT

## 2022-03-10 PROCEDURE — 11102 TANGNTL BX SKIN SINGLE LES: CPT | Mod: 59

## 2022-03-10 PROCEDURE — ? LIQUID NITROGEN

## 2022-03-10 PROCEDURE — 99213 OFFICE O/P EST LOW 20 MIN: CPT | Mod: 25

## 2022-03-10 PROCEDURE — ? COUNSELING

## 2022-03-10 PROCEDURE — ? BIOPSY BY SHAVE METHOD

## 2022-03-10 ASSESSMENT — LOCATION DETAILED DESCRIPTION DERM
LOCATION DETAILED: INFERIOR THORACIC SPINE
LOCATION DETAILED: LEFT CENTRAL FRONTAL SCALP
LOCATION DETAILED: RIGHT MEDIAL SUPERIOR CHEST
LOCATION DETAILED: LEFT LATERAL SUPERIOR CHEST
LOCATION DETAILED: SUPERIOR LUMBAR SPINE
LOCATION DETAILED: SUPERIOR THORACIC SPINE

## 2022-03-10 ASSESSMENT — LOCATION SIMPLE DESCRIPTION DERM
LOCATION SIMPLE: CHEST
LOCATION SIMPLE: LOWER BACK
LOCATION SIMPLE: LEFT SCALP
LOCATION SIMPLE: UPPER BACK

## 2022-03-10 ASSESSMENT — LOCATION ZONE DERM
LOCATION ZONE: TRUNK
LOCATION ZONE: SCALP

## 2022-03-10 NOTE — PROCEDURE: LIQUID NITROGEN
Post-Care Instructions: I reviewed with the patient in detail post-care instructions. Patient is to wear sunprotection, and avoid picking at any of the treated lesions. Pt may apply Vaseline to crusted or scabbing areas.
Add 52 Modifier (Optional): no
Show Applicator Variable?: Yes
Detail Level: Detailed
Spray Paint Text: The liquid nitrogen was applied to the skin utilizing a spray paint frosting technique.
Consent: The patient's consent was obtained including but not limited to risks of crusting, scabbing, blistering, scarring, darker or lighter pigmentary change, recurrence, incomplete removal and infection.
Medical Necessity Information: It is in your best interest to select a reason for this procedure from the list below. All of these items fulfill various CMS LCD requirements except the new and changing color options.
Number Of Freeze-Thaw Cycles: 3 freeze-thaw cycles
Medical Necessity Clause: This procedure was medically necessary because the lesions that were treated were:

## 2022-03-10 NOTE — PROCEDURE: BIOPSY BY SHAVE METHOD
Body Location Override (Optional - Billing Will Still Be Based On Selected Body Map Location If Applicable): left chest
Detail Level: Detailed
Depth Of Biopsy: dermis
Was A Bandage Applied: Yes
Size Of Lesion In Cm: 0
X Size Of Lesion In Cm: 0.5
Biopsy Type: H and E
Biopsy Method: Dermablade
Anesthesia Type: 1% lidocaine with epinephrine
Hemostasis: Aluminum Chloride
Wound Care: Vaseline
Dressing: bandage
Destruction After The Procedure: No
Type Of Destruction Used: Electrodesiccation and Curettage
Curettage Text: The wound bed was treated with curettage after the biopsy was performed.
Cryotherapy Text: The wound bed was treated with cryotherapy after the biopsy was performed.
Electrodesiccation Text: The wound bed was treated with electrodesiccation after the biopsy was performed.
Electrodesiccation And Curettage Text: The wound bed was treated with electrodesiccation and curettage after the biopsy was performed.
Silver Nitrate Text: The wound bed was treated with silver nitrate after the biopsy was performed.
Lab: [facility]
Lab Facility: [address]
Consent: The provider's intent is to obtain a tissue sample solely for diagnostic purposes. Written consent to obtain tissue sample was obtained and risks were reviewed including but not limited to scarring, infection, bleeding, scabbing, incomplete removal, nerve damage and allergy to anesthesia.
Post-Care Instructions: I reviewed with the patient in detail post-care instructions. Patient is to keep the biopsy site dry overnight, and then apply bacitracin twice daily until healed. Patient may apply hydrogen peroxide soaks to remove any crusting.
Notification Instructions: Patient will be notified of biopsy results. However, patient instructed to call the office if not contacted within 2 weeks.
Billing Type: [employer]
Information: Selecting Yes will display possible errors in your note based on the variables you have selected. This validation is only offered as a suggestion for you. PLEASE NOTE THAT THE VALIDATION TEXT WILL BE REMOVED WHEN YOU FINALIZE YOUR NOTE. IF YOU WANT TO FAX A PRELIMINARY NOTE YOU WILL NEED TO TOGGLE THIS TO 'NO' IF YOU DO NOT WANT IT IN YOUR FAXED NOTE.

## 2022-03-10 NOTE — HPI: FULL BODY SKIN EXAMINATION
What Type Of Note Output Would You Prefer (Optional)?: Standard Output
What Is The Reason For Today's Visit?: Full Body Skin Examination
What Is The Reason For Today's Visit? (Being Monitored For X): concerning skin lesions on an annual basis
Additional History: Patient has no history of skin cancer. He has spots of concern on his left scalp, left chest and right nose.

## 2022-03-30 ENCOUNTER — RX RENEWAL (OUTPATIENT)
Age: 62
End: 2022-03-30

## 2022-04-15 ENCOUNTER — APPOINTMENT (OUTPATIENT)
Dept: OPHTHALMOLOGY | Facility: CLINIC | Age: 62
End: 2022-04-15
Payer: MEDICARE

## 2022-04-15 ENCOUNTER — NON-APPOINTMENT (OUTPATIENT)
Age: 62
End: 2022-04-15

## 2022-04-15 PROCEDURE — 99213 OFFICE O/P EST LOW 20 MIN: CPT

## 2022-04-26 ENCOUNTER — RX RENEWAL (OUTPATIENT)
Age: 62
End: 2022-04-26

## 2022-05-03 ENCOUNTER — APPOINTMENT (OUTPATIENT)
Dept: CARDIOLOGY | Facility: CLINIC | Age: 62
End: 2022-05-03
Payer: MEDICARE

## 2022-05-03 ENCOUNTER — APPOINTMENT (OUTPATIENT)
Dept: PULMONOLOGY | Facility: CLINIC | Age: 62
End: 2022-05-03
Payer: MEDICARE

## 2022-05-03 VITALS
BODY MASS INDEX: 31.81 KG/M2 | WEIGHT: 240 LBS | HEART RATE: 74 BPM | DIASTOLIC BLOOD PRESSURE: 80 MMHG | RESPIRATION RATE: 16 BRPM | HEIGHT: 73 IN | SYSTOLIC BLOOD PRESSURE: 142 MMHG

## 2022-05-03 VITALS
OXYGEN SATURATION: 96 % | DIASTOLIC BLOOD PRESSURE: 84 MMHG | RESPIRATION RATE: 16 BRPM | SYSTOLIC BLOOD PRESSURE: 134 MMHG | HEART RATE: 90 BPM

## 2022-05-03 PROCEDURE — 99214 OFFICE O/P EST MOD 30 MIN: CPT

## 2022-05-03 PROCEDURE — 93000 ELECTROCARDIOGRAM COMPLETE: CPT

## 2022-05-03 RX ORDER — OMEGA-3-ACID ETHYL ESTERS 1 G/1
1 CAPSULE, LIQUID FILLED ORAL
Refills: 0 | Status: DISCONTINUED | COMMUNITY
End: 2022-05-03

## 2022-05-03 RX ORDER — MONTELUKAST SODIUM 10 MG/1
10 TABLET, FILM COATED ORAL
Refills: 0 | Status: DISCONTINUED | COMMUNITY
End: 2022-05-03

## 2022-05-03 RX ORDER — RIVAROXABAN 20 MG/1
20 TABLET, FILM COATED ORAL
Qty: 30 | Refills: 0 | Status: DISCONTINUED | COMMUNITY
End: 2022-05-03

## 2022-05-03 NOTE — PHYSICAL EXAM
[No Acute Distress] : no acute distress [Well Nourished] : well nourished [Well Developed] : well developed [Normal Appearance] : normal appearance [Supple] : supple [Normal Rate/Rhythm] : normal rate/rhythm [Normal S1, S2] : normal s1, s2 [No Murmurs] : no murmurs [No Resp Distress] : no resp distress [No Acc Muscle Use] : no acc muscle use [Normal Rhythm and Effort] : normal rhythm and effort [Clear to Auscultation Bilaterally] : clear to auscultation bilaterally [No Abnormalities] : no abnormalities [Benign] : benign [Not Tender] : not tender [Soft] : soft [No Clubbing] : no clubbing [No Edema] : no edema [No Focal Deficits] : no focal deficits [Oriented x3] : oriented x3

## 2022-05-03 NOTE — DISCUSSION/SUMMARY
[FreeTextEntry1] : \par #1. PFTs performed previously were essentially normal with a mildly reduced FVC and TLC\par #2. The patient does not appear to require chronic BD therapy at this time but is on Breo per Dr. Mendelson who has retired; will continue Breo for now given intermittent wheeze\par #3. SOBOE is likely related to weight or deconditioning given normal PFTs\par #4. Diet and exercise for weight loss \par #5. Continue autoCPAP to treat severe ABBIE with an AHI of 42.1; encouraged compliance; The patient is using and benefitting from CPAP therapy \par #6. F/u 4-6 months with compliance and PFTs\par #7. Prior lung imaging studies were unremarkable\par #8. D/w cardiology for possible cardiac cath for possible CAD though prior cath in 2017 was reportedly unremarkable\par #9. Pt had both Covid vaccines and booster; s/p flu vaccine\par #10. Reviewed risks of exposure and symptoms of Covid-19 virus, including how the virus is spread and precautions to avoid alessia virus.\par \par The patient expressed understanding and agreement with the above recommendations/plan and accepts responsibility to be compliant with recommended testing, therapies, and f/u visits.\par All relevant questions and concerns were addressed.

## 2022-05-03 NOTE — REVIEW OF SYSTEMS
[SOB on Exertion] : sob on exertion [Seasonal Allergies] : seasonal allergies [GERD] : gerd [Postnasal Drip] : postnasal drip [Anemia] : anemia [Negative] : Endocrine

## 2022-05-03 NOTE — CONSULT LETTER
[Dear  ___] : Dear  [unfilled], [Consult Letter:] : I had the pleasure of evaluating your patient, [unfilled]. [Please see my note below.] : Please see my note below. [Consult Closing:] : Thank you very much for allowing me to participate in the care of this patient.  If you have any questions, please do not hesitate to contact me. [Sincerely,] : Sincerely, [DrIvan  ___] : Dr. HARRIS [FreeTextEntry3] : Romain Vega MD, FCCP, D. ABSM\par Pulmonary and Sleep Medicine\par [facility] Pulmonary and Sleep [facility] at [place]

## 2022-05-03 NOTE — HISTORY OF PRESENT ILLNESS
[Former] : former [Never] : never [Excess Weight] : excess weight [Dyspnea] : dyspnea [Low Calorie Diet] : low calorie diet [Fair Compliance] : fair compliance with treatment [Fair Tolerance] : fair tolerance of treatment [Poor Symptom Control] : poor symptom control [Sleep Apnea] : sleep apnea [High] : high [Low Calorie] : low calorie [Well Balanced Diet] : well balanced meals [Infrequently] : exercises infrequently [Walking] : walking [TextBox_4] : The patient gives a h/o possible asthma vs COPD or reactive airways disease for which he has been maintained on Symbicort/Breo and on Montelukast therapy. \par He is followed with cardiology for reported nonischemic CM though now with normal EF, s/p AICD for reported ventricular arrhythmia, and h/o pericarditis for which he is on Colchicine. \par He reports that he normal can walk up to 6 miles daily but over the past 6 weeks he is having difficulty walking even a few blocks. He was seen by his PCP/pulm, cardiology and even was admitted to [facility] from 8/30/21-9/3/21 but w/u including CXR and CTA were unremarkable. He was d/c'd as a COPD exacerbation.\par He is a former smoker of 1 ppd x 4 years, quit 1984. [TextBox_11] : 1.5 [TextBox_13] : 4 [YearQuit] : 1984 [Follow-Up - Routine Clinic] : a routine clinic follow-up of [Mild Persistent] : mild persistent [Dyspnea on Exertion] : dyspnea on exertion [Currently Experiencing] : The patient is currently experiencing symptoms. [None] : No associated symptoms are reported [Inhaled Short-Acting Beta-2 Agonists] : inhaled short-acting beta-2 agonists [Inhaled Long-Acting Beta-2 Agonists] : inhaled long-acting beta-2 agonists [Inhaled Corticosteroids] : inhaled corticosteroids [Leukotriene Modifiers] : leukotriene modifiers [Good Compliance] : good compliance with treatment [Good Tolerance] : good tolerance of treatment [Good Symptom Control] : good symptom control [de-identified] : AutoCPAP

## 2022-05-04 NOTE — HISTORY OF PRESENT ILLNESS
[FreeTextEntry1] : Mr. Giles presents today following his winter in [place] where he states he felt great.  The warm weather agrees with his pulmonary issues.  He has gained some weight and needs to exercise on more regular basis. \par

## 2022-05-04 NOTE — REASON FOR VISIT
[FreeTextEntry1] : Mr. Giles is a pleasant 61-year-old white male with a past medical history significant for non-ischemic/dilated cardiomyopathy with normal coronary arteries noted on most recent cardiac catheterization, 9/2021, as well as a history of status-post AICD implantation for symptomatic ventricular arrhythmia, paroxysmal atrial fibrillation for which he is status-post ablation, and lastly, reactive airway disease/asthma who presents for follow up evaluation.

## 2022-05-04 NOTE — ASSESSMENT
[FreeTextEntry1] : 1.  EKG today reveals normal sinus rhythm at 74 bpm.  Left anterior hemiblock with left axis deviation.  No acute ischemic changes. \par \par 2.  Non-ischemic dilated cardiomyopathy:  Clinically stable without chest pain, shortness of breath, or other symptoms.  Appears to be tolerating medications well.  Advised to exercise on a regular basis.  Will undergo fasting bloodwork prior to his next office visit in three months.\par \par 3.  AICD implantation:  Clinically stable.  Followed by Dr. Vicente at [facility].  Will undergo device analysis in the future.  \par \par 4.  Paroxysmal atrial fibrillation status-post ablation:  Remains in sinus rhythm.  Advised to continue Eliquis 5 mg b.i.d.  If clinically stable, office visit three months.        \par

## 2022-05-06 ENCOUNTER — NON-APPOINTMENT (OUTPATIENT)
Age: 62
End: 2022-05-06

## 2022-05-06 ENCOUNTER — APPOINTMENT (OUTPATIENT)
Dept: OPHTHALMOLOGY | Facility: CLINIC | Age: 62
End: 2022-05-06
Payer: MEDICARE

## 2022-05-06 PROCEDURE — 99213 OFFICE O/P EST LOW 20 MIN: CPT

## 2022-05-10 ENCOUNTER — RX CHANGE (OUTPATIENT)
Age: 62
End: 2022-05-10

## 2022-06-14 ENCOUNTER — APPOINTMENT (OUTPATIENT)
Dept: OPHTHALMOLOGY | Facility: CLINIC | Age: 62
End: 2022-06-14
Payer: MEDICARE

## 2022-06-14 ENCOUNTER — NON-APPOINTMENT (OUTPATIENT)
Age: 62
End: 2022-06-14

## 2022-06-14 PROCEDURE — 99213 OFFICE O/P EST LOW 20 MIN: CPT

## 2022-08-05 ENCOUNTER — LABORATORY RESULT (OUTPATIENT)
Age: 62
End: 2022-08-05

## 2022-08-09 ENCOUNTER — APPOINTMENT (OUTPATIENT)
Dept: PULMONOLOGY | Facility: CLINIC | Age: 62
End: 2022-08-09

## 2022-08-09 ENCOUNTER — APPOINTMENT (OUTPATIENT)
Dept: CARDIOLOGY | Facility: CLINIC | Age: 62
End: 2022-08-09

## 2022-08-09 VITALS
DIASTOLIC BLOOD PRESSURE: 74 MMHG | WEIGHT: 220 LBS | RESPIRATION RATE: 16 BRPM | BODY MASS INDEX: 29.16 KG/M2 | HEIGHT: 73 IN | SYSTOLIC BLOOD PRESSURE: 130 MMHG | HEART RATE: 61 BPM

## 2022-08-09 VITALS
RESPIRATION RATE: 16 BRPM | BODY MASS INDEX: 29.29 KG/M2 | SYSTOLIC BLOOD PRESSURE: 132 MMHG | HEIGHT: 73 IN | HEART RATE: 71 BPM | DIASTOLIC BLOOD PRESSURE: 82 MMHG | OXYGEN SATURATION: 96 % | WEIGHT: 221 LBS

## 2022-08-09 VITALS — HEIGHT: 73 IN | BODY MASS INDEX: 30.35 KG/M2 | WEIGHT: 229 LBS

## 2022-08-09 DIAGNOSIS — Z87.09 PERSONAL HISTORY OF OTHER DISEASES OF THE RESPIRATORY SYSTEM: ICD-10-CM

## 2022-08-09 PROCEDURE — 93000 ELECTROCARDIOGRAM COMPLETE: CPT

## 2022-08-09 PROCEDURE — 94727 GAS DIL/WSHOT DETER LNG VOL: CPT

## 2022-08-09 PROCEDURE — 99214 OFFICE O/P EST MOD 30 MIN: CPT

## 2022-08-09 PROCEDURE — 94729 DIFFUSING CAPACITY: CPT

## 2022-08-09 PROCEDURE — 99214 OFFICE O/P EST MOD 30 MIN: CPT | Mod: 25

## 2022-08-09 PROCEDURE — 94010 BREATHING CAPACITY TEST: CPT

## 2022-08-09 PROCEDURE — 85018 HEMOGLOBIN: CPT | Mod: QW

## 2022-08-09 RX ORDER — DOXYCYCLINE 100 MG/1
100 CAPSULE ORAL
Qty: 14 | Refills: 0 | Status: DISCONTINUED | COMMUNITY
Start: 2022-08-07

## 2022-08-09 RX ORDER — AMOXICILLIN 500 MG/1
500 CAPSULE ORAL
Qty: 16 | Refills: 0 | Status: DISCONTINUED | COMMUNITY
Start: 2022-05-16

## 2022-08-09 RX ORDER — SODIUM SULFATE, POTASSIUM SULFATE, MAGNESIUM SULFATE 17.5; 3.13; 1.6 G/ML; G/ML; G/ML
17.5-3.13-1.6 SOLUTION, CONCENTRATE ORAL
Qty: 354 | Refills: 0 | Status: DISCONTINUED | COMMUNITY
Start: 2022-06-24

## 2022-08-09 NOTE — CONSULT LETTER
[Dear  ___] : Dear  [unfilled], [Consult Letter:] : I had the pleasure of evaluating your patient, [unfilled]. [Please see my note below.] : Please see my note below. [Consult Closing:] : Thank you very much for allowing me to participate in the care of this patient.  If you have any questions, please do not hesitate to contact me. [Sincerely,] : Sincerely, [DrIvan  ___] : Dr. HARRIS [FreeTextEntry3] : Romain Vega MD, FCCP, D. ABSM\par Pulmonary and Sleep Medicine\par [facility]monary and Sleep Medicine at [place]

## 2022-08-09 NOTE — HISTORY OF PRESENT ILLNESS
[Former] : former [Never] : never [Mild Persistent] : mild persistent [Dyspnea on Exertion] : dyspnea on exertion [Inhaled Short-Acting Beta-2 Agonists] : inhaled short-acting beta-2 agonists [Inhaled Long-Acting Beta-2 Agonists] : inhaled long-acting beta-2 agonists [Inhaled Corticosteroids] : inhaled corticosteroids [Leukotriene Modifiers] : leukotriene modifiers [None] : No associated symptoms are reported [Good Compliance] : good compliance with treatment [Good Tolerance] : good tolerance of treatment [Good Symptom Control] : good symptom control [Follow-Up - Routine Clinic] : a routine clinic follow-up of [Excess Weight] : excess weight [Currently Experiencing] : The patient is currently experiencing symptoms. [Dyspnea] : dyspnea [Low Calorie Diet] : low calorie diet [Fair Compliance] : fair compliance with treatment [Fair Tolerance] : fair tolerance of treatment [Poor Symptom Control] : poor symptom control [Sleep Apnea] : sleep apnea [High] : high [Low Calorie] : low calorie [Well Balanced Diet] : well balanced meals [Infrequently] : exercises infrequently [Walking] : walking [TextBox_4] : The patient gives a h/o possible asthma vs COPD or reactive airways disease for which he has been maintained on Symbicort/Breo and on Montelukast therapy. \par He is followed with cardiology for reported nonischemic CM though now with normal EF, s/p AICD for reported ventricular arrhythmia, and h/o pericarditis for which he is on Colchicine. \par He reports that he normal can walk up to 6 miles daily but over the past 6 weeks he is having difficulty walking even a few blocks. He was seen by his PCP/pulm, cardiology and even was admitted to [facility] from 8/30/21-9/3/21 but w/u including CXR and CTA were unremarkable. He was d/c'd as a COPD exacerbation.\par He is a former smoker of 1 ppd x 4 years, quit 1984. [TextBox_11] : 1.5 [TextBox_13] : 4 [YearQuit] : 1984 [de-identified] : AutoCPAP

## 2022-08-09 NOTE — PROCEDURE
[FreeTextEntry1] : PFTs 10/6/21 - Mildly reduced FVC and normal FEV1 without obstruction with mildly reduced lung volumes and normal diffusion capacity\par PFTs 8/9/22 - normal

## 2022-08-09 NOTE — DISCUSSION/SUMMARY
[FreeTextEntry1] : \par #1. PFTs performed previously were essentially normal with a mildly reduced FVC and TLC but now normal PFTs with 30 lb weight loss; \par #2. The patient does not appear to require chronic BD therapy at this time but has been on Breo 200 though given normal lung function will decrease to Breo 100 and evaluate response \par #3. SOBOE is likely related to weight or deconditioning given normal PFTs\par #4. Diet and exercise for weight loss \par #5. Continue autoCPAP to treat severe ABBIE with an AHI of 42.1; encouraged compliance; The patient is using and benefitting from CPAP therapy \par #6. F/u 3 months with compliance and spirometry on Breo 100 from 200\par #7. Prior lung imaging studies were unremarkable\par #8. D/w cardiology for possible cardiac cath for possible CAD though prior cath in 2017 was reportedly unremarkable\par #9. Pt had both Covid vaccines and booster; s/p flu vaccine\par #10. Reviewed risks of exposure and symptoms of Covid-19 virus, including how the virus is spread and precautions to avoid alessia virus.\par \par The patient expressed understanding and agreement with the above recommendations/plan and accepts responsibility to be compliant with recommended testing, therapies, and f/u visits.\par All relevant questions and concerns were addressed.

## 2022-08-09 NOTE — REVIEW OF SYSTEMS
[Postnasal Drip] : postnasal drip [SOB on Exertion] : sob on exertion [Seasonal Allergies] : seasonal allergies [GERD] : gerd [Anemia] : anemia [Negative] : Endocrine

## 2022-08-11 NOTE — REASON FOR VISIT
[FreeTextEntry1] : Mr. Giles is a pleasant 61-year-old white male with a past medical history significant for non-ischemic/dilated cardiomyopathy with normal coronary arteries noted on most recent cardiac catheterization, 9/2021, as well as a history of symptomatic ventricular arrhythmias for which he is status-post AICD implantation, paroxysmal atrial fibrillation status-post ablation, and lastly, reactive airway disease/asthma who presents for follow up evaluation.

## 2022-08-11 NOTE — ASSESSMENT
[FreeTextEntry1] : 1.  EKG today reveals normal sinus rhythm at 61 bpm.  Normal intervals.  Borderline LVH voltage.  No ischemic changes.  \par \par 2.  Non-ischemic/dilated cardiomyopathy:  Clinically stable without chest pain, shortness of breath, or other symptoms.  Exercising on a regular basis.  \par \par 3.  Endoscopy/colonoscopy:  Patient scheduled to undergo routine endoscopy/colonoscopy at [facility] on September 7th.  From a cardiac standpoint, there are no significant contraindications.  Patient advised to discontinue Eliquis two days (four doses) prior to his procedures, as well as hold his omega-3 fish oil and multivitamins for seven days prior to his procedures.  He may take all of his other medications up to and including the morning of his endoscopy.     \par \par 4.  In addition to the above, the patient will undergo follow up echocardiography and an office visit prior to returning to [place] in the winter.  \par

## 2022-08-11 NOTE — HISTORY OF PRESENT ILLNESS
[FreeTextEntry1] : From a cardiac standpoint, Mr. Giles feels well denying exertional chest pain, shortness of breath, or other cardiac symptoms.  He states that he is following a rigorous diet which is composed mostly of fruits and vegetables.  He is walking anywhere from 5 to 10 miles per day and has managed to lose 20 pounds over the last several months.  Overall, he feels “well.”\par \par \par

## 2022-09-07 ENCOUNTER — RESULT REVIEW (OUTPATIENT)
Age: 62
End: 2022-09-07

## 2022-09-19 ENCOUNTER — RESULT REVIEW (OUTPATIENT)
Age: 62
End: 2022-09-19

## 2022-10-31 LAB — SARS-COV-2 N GENE NPH QL NAA+PROBE: NOT DETECTED

## 2022-11-01 ENCOUNTER — APPOINTMENT (OUTPATIENT)
Dept: PULMONOLOGY | Facility: CLINIC | Age: 62
End: 2022-11-01

## 2022-11-01 ENCOUNTER — APPOINTMENT (OUTPATIENT)
Dept: CARDIOLOGY | Facility: CLINIC | Age: 62
End: 2022-11-01

## 2022-11-01 VITALS
DIASTOLIC BLOOD PRESSURE: 70 MMHG | HEART RATE: 60 BPM | SYSTOLIC BLOOD PRESSURE: 120 MMHG | RESPIRATION RATE: 16 BRPM | OXYGEN SATURATION: 98 %

## 2022-11-01 VITALS — BODY MASS INDEX: 29.74 KG/M2 | WEIGHT: 222 LBS | HEIGHT: 72.5 IN

## 2022-11-01 PROCEDURE — 99214 OFFICE O/P EST MOD 30 MIN: CPT | Mod: 25

## 2022-11-01 PROCEDURE — 94010 BREATHING CAPACITY TEST: CPT

## 2022-11-01 PROCEDURE — 93306 TTE W/DOPPLER COMPLETE: CPT

## 2022-11-01 RX ORDER — FLUTICASONE FUROATE AND VILANTEROL TRIFENATATE 200; 25 UG/1; UG/1
200-25 POWDER RESPIRATORY (INHALATION) DAILY
Qty: 3 | Refills: 1 | Status: DISCONTINUED | COMMUNITY
End: 2022-11-01

## 2022-11-01 RX ORDER — TRAMADOL HYDROCHLORIDE 50 MG/1
50 TABLET, COATED ORAL
Qty: 10 | Refills: 0 | Status: DISCONTINUED | COMMUNITY
Start: 2022-09-20

## 2022-11-01 NOTE — DISCUSSION/SUMMARY
[FreeTextEntry1] : \par #1. PFTs performed previously were essentially normal with a mildly reduced FVC and TLC but then normal PFTs with 30 lb weight loss and Breo 200; now with normal spirometry on Breo 100 \par #2. The patient does not appear to require chronic BD therapy at this time but has been on Breo 200 though given normal lung function so decreased to Breo 100 without deterioration; continue to decrease therapy as tolerates when pt comfortable\par #3. SOBOE is likely related to weight or deconditioning given normal PFTs\par #4. Diet and exercise for weight loss \par #5. Continue autoCPAP to treat severe ABBIE with an AHI of 42.1; encouraged compliance; The patient is using and benefitting from CPAP therapy \par #6. Replace PAP equipment as needed; ordered 5/6/22 \par #7. Prior lung imaging studies were unremarkable\par #8. Cardiology f/u to optimize cardiac function. \par #9. Pt had both Covid vaccines and booster; s/p flu vaccine\par #10. F/u 6 months with compliance \par #11. Reviewed risks of exposure and symptoms of Covid-19 virus, including how the virus is spread and precautions to avoid alessia virus.\par \par The patient expressed understanding and agreement with the above recommendations/plan and accepts responsibility to be compliant with recommended testing, therapies, and f/u visits.\par All relevant questions and concerns were addressed.

## 2022-11-01 NOTE — HISTORY OF PRESENT ILLNESS
[Former] : former [Never] : never [Mild Persistent] : mild persistent [Dyspnea on Exertion] : dyspnea on exertion [Inhaled Short-Acting Beta-2 Agonists] : inhaled short-acting beta-2 agonists [Inhaled Long-Acting Beta-2 Agonists] : inhaled long-acting beta-2 agonists [Inhaled Corticosteroids] : inhaled corticosteroids [Leukotriene Modifiers] : leukotriene modifiers [None] : No associated symptoms are reported [Follow-Up - Routine Clinic] : a routine clinic follow-up of [Excess Weight] : excess weight [Currently Experiencing] : The patient is currently experiencing symptoms. [Dyspnea] : dyspnea [Low Calorie Diet] : low calorie diet [Good Compliance] : good compliance with treatment [Good Tolerance] : good tolerance of treatment [Good Symptom Control] : good symptom control [Sleep Apnea] : sleep apnea [High] : high [Low Calorie] : low calorie [Well Balanced Diet] : well balanced meals [Infrequently] : exercises infrequently [Walking] : walking [TextBox_4] : The patient gives a h/o possible asthma vs COPD or reactive airways disease for which he has been maintained on Symbicort/Breo and on Montelukast therapy. \par He is followed with cardiology for reported nonischemic CM though now with normal EF, s/p AICD for reported ventricular arrhythmia, and h/o pericarditis for which he is on Colchicine. \par Pt previously with increased SOB and was seen by his PCP/pulm, cardiology and admitted to [facility] from 8/30/21-9/3/21 but w/u including CXR and CTA were unremarkable. He was d/c'd as a COPD exacerbation.\par He is a former smoker of 1 ppd x 4 years, quit 1984. [TextBox_11] : 1.5 [TextBox_13] : 4 [YearQuit] : 1984 [de-identified] : AutoCPAP

## 2022-11-01 NOTE — CONSULT LETTER
[Dear  ___] : Dear  [unfilled], [Consult Letter:] : I had the pleasure of evaluating your patient, [unfilled]. [Please see my note below.] : Please see my note below. [Consult Closing:] : Thank you very much for allowing me to participate in the care of this patient.  If you have any questions, please do not hesitate to contact me. [Sincerely,] : Sincerely, [DrIvan  ___] : Dr. HARRIS [FreeTextEntry3] : Romain Vega MD, FCCP, D. ABSM\par Pulmonary and Sleep Medicine\par [facility] at [place]

## 2022-11-01 NOTE — PROCEDURE
[FreeTextEntry1] : PFTs 10/6/21 - Mildly reduced FVC and normal FEV1 without obstruction with mildly reduced lung volumes and normal diffusion capacity\par PFTs 8/9/22 - normal on Breo 200\par Spirometry 11/1/22 - normal on Breo 100

## 2022-11-04 ENCOUNTER — NON-APPOINTMENT (OUTPATIENT)
Age: 62
End: 2022-11-04

## 2022-11-11 ENCOUNTER — APPOINTMENT (OUTPATIENT)
Dept: CARDIOLOGY | Facility: CLINIC | Age: 62
End: 2022-11-11

## 2022-11-11 VITALS
HEIGHT: 74 IN | WEIGHT: 212 LBS | RESPIRATION RATE: 16 BRPM | BODY MASS INDEX: 27.21 KG/M2 | HEART RATE: 71 BPM | SYSTOLIC BLOOD PRESSURE: 122 MMHG | DIASTOLIC BLOOD PRESSURE: 80 MMHG

## 2022-11-11 PROCEDURE — 93000 ELECTROCARDIOGRAM COMPLETE: CPT

## 2022-11-11 PROCEDURE — 99214 OFFICE O/P EST MOD 30 MIN: CPT

## 2022-11-11 RX ORDER — TURMERIC ROOT EXTRACT 500 MG
TABLET ORAL
Refills: 0 | Status: ACTIVE | COMMUNITY

## 2022-11-14 NOTE — REASON FOR VISIT
[FreeTextEntry1] : Mr. Giles is a pleasant 61-year-old white male with a past medical history significant for non-ischemic/dilated cardiomyopathy with known normal coronary arteries noted on most recent cardiac catheterization (2021), as well as a history of symptomatic ventricular arrhythmias for which he is status-post AICD implantation, paroxysmal atrial fibrillation status-post ablation, and lastly, reactive airway disease/asthma who presents for follow up evaluation.

## 2022-11-14 NOTE — ASSESSMENT
[FreeTextEntry1] : 1.  EKG today reveals normal sinus rhythm at 71 bpm.  Normal intervals.  No evidence of ischemia. \par \par 2.  Hyperlipidemia:  Review of recent bloodwork demonstrates a total cholesterol of 146, HDL 38, LDL 96, triglycerides 56.  Patient advised on a low-fat / low-cholesterol diet and continuation of current medications. \par \par 3.  Dilated non-ischemic cardiomyopathy:  Clinically stable without significant symptoms.  Recent echocardiography revealed normal left ventricular chamber dimensions and wall motion.  Preserved ejection fraction estimated between 55 and 60%.  There is severe dilatation of his left atrium as well as moderate dilatation of his right-sided chambers.  There is mild dilatation of the ascending aorta.  Trace AI noted.  Trace MR and trace pulmonic insufficiency.  Pulmonary artery pressures within normal limits.  No other significant findings.  \par \par 4.  Given the above, the patient is advised to continue all current medications, follow a strict low-salt / low-fat / low-cholesterol diet and exercise on a regular basis.  Will traveling down to [place] and return to [place] in approximately six months. \par   \par

## 2022-11-14 NOTE — HISTORY OF PRESENT ILLNESS
[FreeTextEntry1] : From a cardiac standpoint, Mr. Giles denies exertional chest pain, shortness of breath, or other cardiac symptoms.  He remains physically active and states he is walking 10-12 miles per day.  Has lost considerable weight. \par \par

## 2022-12-13 ENCOUNTER — APPOINTMENT (OUTPATIENT)
Dept: OPHTHALMOLOGY | Facility: CLINIC | Age: 62
End: 2022-12-13

## 2022-12-13 ENCOUNTER — NON-APPOINTMENT (OUTPATIENT)
Age: 62
End: 2022-12-13

## 2022-12-13 PROCEDURE — 92012 INTRM OPH EXAM EST PATIENT: CPT

## 2023-02-16 NOTE — PROGRESS NOTE ADULT - SUBJECTIVE AND OBJECTIVE BOX
Normal coronary arteries.   Normal right heart pressures.   Normal LV function.   Borderline normal Aorta.     No further interventional management.  [Time Spent: ___ minutes] : I have spent [unfilled] minutes of time on the encounter. [>50% of the face to face encounter time was spent on counseling and/or coordination of care for ___] : Greater than 50% of the face to face encounter time was spent on counseling and/or coordination of care for [unfilled]

## 2023-02-17 RX ORDER — FLUTICASONE FUROATE AND VILANTEROL 200; 25 UG/1; UG/1
200-25 POWDER RESPIRATORY (INHALATION)
Qty: 3 | Refills: 0 | Status: ACTIVE | COMMUNITY
Start: 2023-02-17 | End: 1900-01-01

## 2023-04-12 RX ORDER — OMEPRAZOLE 40 MG/1
CAPSULE, DELAYED RELEASE ORAL
Refills: 0 | Status: DISCONTINUED | COMMUNITY
End: 2023-04-12

## 2023-05-03 ENCOUNTER — APPOINTMENT (OUTPATIENT)
Dept: PULMONOLOGY | Facility: CLINIC | Age: 63
End: 2023-05-03
Payer: MEDICARE

## 2023-05-03 VITALS
SYSTOLIC BLOOD PRESSURE: 126 MMHG | DIASTOLIC BLOOD PRESSURE: 78 MMHG | HEART RATE: 78 BPM | OXYGEN SATURATION: 96 % | HEIGHT: 74 IN | WEIGHT: 220 LBS | BODY MASS INDEX: 28.23 KG/M2 | RESPIRATION RATE: 16 BRPM

## 2023-05-03 DIAGNOSIS — E66.9 OBESITY, UNSPECIFIED: ICD-10-CM

## 2023-05-03 PROCEDURE — 99215 OFFICE O/P EST HI 40 MIN: CPT

## 2023-05-03 RX ORDER — METHYLPREDNISOLONE 8 MG/1
TABLET ORAL
Refills: 0 | Status: ACTIVE | COMMUNITY

## 2023-05-03 RX ORDER — SACCHAROMYCES BOULARDII 50 MG
CAPSULE ORAL
Refills: 0 | Status: ACTIVE | COMMUNITY

## 2023-05-03 NOTE — REASON FOR VISIT
[Follow-Up] : a follow-up visit [Asthma] : asthma [Sleep Apnea] : sleep apnea [Shortness of Breath] : shortness of breath [Obesity] : obesity [Abnormal CXR/ Chest CT] : an abnormal CXR/ chest CT [Pneumonia] : pneumonia [TextBox_44] : effusions

## 2023-05-03 NOTE — HISTORY OF PRESENT ILLNESS
[Mild Persistent] : mild persistent [Dyspnea on Exertion] : dyspnea on exertion [Inhaled Short-Acting Beta-2 Agonists] : inhaled short-acting beta-2 agonists [Inhaled Long-Acting Beta-2 Agonists] : inhaled long-acting beta-2 agonists [Inhaled Corticosteroids] : inhaled corticosteroids [Leukotriene Modifiers] : leukotriene modifiers [Follow-Up - Routine Clinic] : a routine clinic follow-up of [Excess Weight] : excess weight [Currently Experiencing] : The patient is currently experiencing symptoms. [Dyspnea] : dyspnea [Low Calorie Diet] : low calorie diet [Good Compliance] : good compliance with treatment [Good Tolerance] : good tolerance of treatment [Good Symptom Control] : good symptom control [Sleep Apnea] : sleep apnea [High] : high [Low Calorie] : low calorie [Well Balanced Diet] : well balanced meals [Infrequently] : exercises infrequently [Walking] : walking [On ___] : performed on [unfilled] [Patient] : the patient [Indication ___] : for an indication of [unfilled] [None] : no new symptoms reported [TextBox_4] : The patient gives a h/o possible asthma vs COPD or reactive airways disease for which he has been maintained on Symbicort/Breo and on Montelukast therapy. \par He is followed with cardiology for reported nonischemic CM though now with normal EF, s/p AICD for reported ventricular arrhythmia, and h/o pericarditis for which he is on Colchicine. \par Pt previously with increased SOB and was seen by his PCP/pulm, cardiology and admitted to [facility] from 8/30/21-9/3/21 but w/u including CXR and CTA were unremarkable. He was d/c'd as a COPD exacerbation.\par He is a former smoker of 1 ppd x 4 years, quit 1984.\par Pt was hospitalized at [facility] from 4/19-4/26/23 per pt for hmpv infection. He was d/c'd on Xopenex and Atrovent nebulizers and prednisone taper but not on home O2. He is continuing his prednisone taper and overall is significantly improved but not quite back to baseline. [de-identified] : AutoCPAP [FreeTextEntry9] : Chest CT [FreeTextEntry8] : Small b/l effusions and atelectasis

## 2023-05-03 NOTE — CONSULT LETTER
[Dear  ___] : Dear  [unfilled], [Consult Letter:] : I had the pleasure of evaluating your patient, [unfilled]. [Please see my note below.] : Please see my note below. [Consult Closing:] : Thank you very much for allowing me to participate in the care of this patient.  If you have any questions, please do not hesitate to contact me. [Sincerely,] : Sincerely, [DrIvan  ___] : Dr. HARRIS [FreeTextEntry3] : Romain Vega MD, FCCP, D. ABSM\par Pulmonary and Sleep Medicine\par [facility]monary [facility] Sleep [facility] at [place]

## 2023-05-03 NOTE — DISCUSSION/SUMMARY
[FreeTextEntry1] : \par #1. PFTs performed previously were essentially normal with a mildly reduced FVC and TLC but then normal PFTs with 30 lb weight loss and Breo 200; now with normal spirometry on Breo 100 \par #2. Continue Breo 100 given normal lung function on Breo 200; consider further decrease in therapy as tolerates when pt comfortable but continue current therapy for now given recent hospitalization\par #3. SOBOE is likely related to weight or deconditioning given normal PFTs\par #4. Diet and exercise for weight loss \par #5. Continue autoCPAP to treat severe ABBIE with an AHI of 42.1; encouraged compliance; The patient is using and benefitting from CPAP therapy \par #6. Replace PAP equipment as needed; ordered 5/3/23 \par #7. Prior lung imaging studies were unremarkable\par #8. Cardiology f/u to optimize cardiac function. \par #9. Pt had both Covid vaccines and booster; s/p flu vaccine\par #10. F/u 3 months with compliance, CT, and PFTs\par #11. Reviewed risks of exposure and symptoms of Covid-19 virus, including how the virus is spread and precautions to avoid alessia virus.\par \par The patient expressed understanding and agreement with the above recommendations/plan and accepts responsibility to be compliant with recommended testing, therapies, and f/u visits.\par All relevant questions and concerns were addressed.

## 2023-05-05 ENCOUNTER — APPOINTMENT (OUTPATIENT)
Dept: CARDIOLOGY | Facility: CLINIC | Age: 63
End: 2023-05-05
Payer: MEDICARE

## 2023-05-05 VITALS
DIASTOLIC BLOOD PRESSURE: 70 MMHG | BODY MASS INDEX: 28.23 KG/M2 | WEIGHT: 220 LBS | RESPIRATION RATE: 17 BRPM | HEIGHT: 74 IN | OXYGEN SATURATION: 98 % | SYSTOLIC BLOOD PRESSURE: 124 MMHG | HEART RATE: 73 BPM

## 2023-05-05 VITALS — HEART RATE: 73 BPM

## 2023-05-05 VITALS — HEIGHT: 74 IN | WEIGHT: 220 LBS | BODY MASS INDEX: 28.23 KG/M2

## 2023-05-05 DIAGNOSIS — D56.9 THALASSEMIA, UNSPECIFIED: ICD-10-CM

## 2023-05-05 PROCEDURE — 93000 ELECTROCARDIOGRAM COMPLETE: CPT

## 2023-05-05 PROCEDURE — 99214 OFFICE O/P EST MOD 30 MIN: CPT

## 2023-05-08 NOTE — ASSESSMENT
[FreeTextEntry1] : \par 1. EKG today reveals normal sinus rhythm at 73 bpm.  Normal intervals.  LVH voltage.  No ischemic changes.  \par \par 2. Non-ischemic dilated cardiomyopathy:  Clinically stable without chest pain or shortness of breath.  Echocardiography performed at [place] revealed normal left ventricular chamber dimensions and wall motion with an ejection fraction estimated between 55 and 60%.  No other significant findings noted.  \par \par 3. Recent infection with human metapneumovirus:  Clinically stable at this time on his inhalers and steroids.  Will follow up with PCP and pulmonary.  \par \par 4. If clinically stable from a cardiac standpoint, patient advised to walk as much as possible.  Will likely  undergo follow up echocardiography in approximately three months. \par   \par

## 2023-05-08 NOTE — REASON FOR VISIT
[FreeTextEntry1] : Mr. Giles is a pleasant 62-year-old white male with a past medical history significant for non-ischemic/dilated cardiomyopathy with known normal coronary arteries noted on most recent cardiac catheterization (2021), as well as a history of symptomatic ventricular arrhythmias for which he is status-post AICD implantation, paroxysmal atrial fibrillation status-post ablation, and lastly, reactive airway disease/asthma who presents for follow up evaluation.

## 2023-05-08 NOTE — HISTORY OF PRESENT ILLNESS
[FreeTextEntry1] : From a cardiac standpoint, Mr. Giles denies significant chest pain or shortness of breath.  He was recently admitted to [facility] with a one-week history of severe shortness of breath and chest congestion and tested positive for human metapneumovirus.  Patient subsequently improved and was discharged on Xopenex and Atrovent nebulizers as well as a Prednisone taper and home O2.  \par

## 2023-05-11 ENCOUNTER — NON-APPOINTMENT (OUTPATIENT)
Age: 63
End: 2023-05-11

## 2023-05-11 ENCOUNTER — APPOINTMENT (OUTPATIENT)
Dept: OPHTHALMOLOGY | Facility: CLINIC | Age: 63
End: 2023-05-11
Payer: MEDICARE

## 2023-05-11 PROCEDURE — 99213 OFFICE O/P EST LOW 20 MIN: CPT

## 2023-06-13 ENCOUNTER — APPOINTMENT (OUTPATIENT)
Dept: OPHTHALMOLOGY | Facility: CLINIC | Age: 63
End: 2023-06-13
Payer: MEDICARE

## 2023-06-13 ENCOUNTER — NON-APPOINTMENT (OUTPATIENT)
Age: 63
End: 2023-06-13

## 2023-06-13 PROCEDURE — 92014 COMPRE OPH EXAM EST PT 1/>: CPT

## 2023-07-13 ENCOUNTER — RX RENEWAL (OUTPATIENT)
Age: 63
End: 2023-07-13

## 2023-07-14 ENCOUNTER — APPOINTMENT (OUTPATIENT)
Dept: CARDIOLOGY | Facility: CLINIC | Age: 63
End: 2023-07-14
Payer: MEDICARE

## 2023-07-14 VITALS
WEIGHT: 220 LBS | HEART RATE: 71 BPM | RESPIRATION RATE: 16 BRPM | SYSTOLIC BLOOD PRESSURE: 160 MMHG | DIASTOLIC BLOOD PRESSURE: 88 MMHG | HEIGHT: 74 IN | BODY MASS INDEX: 28.23 KG/M2

## 2023-07-14 DIAGNOSIS — R11.0 NAUSEA: ICD-10-CM

## 2023-07-14 DIAGNOSIS — R07.89 OTHER CHEST PAIN: ICD-10-CM

## 2023-07-14 DIAGNOSIS — I31.9 DISEASE OF PERICARDIUM, UNSPECIFIED: ICD-10-CM

## 2023-07-14 DIAGNOSIS — R11.10 VOMITING, UNSPECIFIED: ICD-10-CM

## 2023-07-14 DIAGNOSIS — F41.9 ANXIETY DISORDER, UNSPECIFIED: ICD-10-CM

## 2023-07-14 PROCEDURE — 93000 ELECTROCARDIOGRAM COMPLETE: CPT

## 2023-07-14 PROCEDURE — 99214 OFFICE O/P EST MOD 30 MIN: CPT

## 2023-07-14 PROCEDURE — 93306 TTE W/DOPPLER COMPLETE: CPT

## 2023-07-14 RX ORDER — ALPRAZOLAM 0.25 MG/1
0.25 TABLET ORAL
Qty: 20 | Refills: 0 | Status: ACTIVE | COMMUNITY
Start: 2023-07-14 | End: 1900-01-01

## 2023-07-17 NOTE — REASON FOR VISIT
[FreeTextEntry1] : Mr. Giles is a pleasant 62-year-old white male with a past medical history significant for non-ischemic/dilated cardiomyopathy with known normal coronary arteries noted on most recent cardiac catheterization (2021), as well as a history of symptomatic ventricular arrhythmias for which he is status-post AICD implantation, paroxysmal atrial fibrillation status-post ablation, history of recurrent pericarditis, recent infection with human meta pneumovirus, and lastly, reactive airway disease/asthma who presents for evaluation.

## 2023-07-17 NOTE — HISTORY OF PRESENT ILLNESS
[FreeTextEntry1] : Mr. Giles presents here today with complaints of retrosternal chest discomfort described as a pressure-like sensation. He states he woke up fine this morning and went for a 5-mile walk. Following that, he had breakfast which consisted of four eggs, three pieces of toast, and fruit. He was scheduled for echocardiography in this office this morning, and on his drive over, began experiencing chest discomfort. He admits to being under considerable stress at home and admits to an episode of nausea and dry heaves five days ago while at home. Patient currently being treated aggressively for GERD by gastroenterology. \par

## 2023-07-24 ENCOUNTER — APPOINTMENT (OUTPATIENT)
Dept: CT IMAGING | Facility: CLINIC | Age: 63
End: 2023-07-24
Payer: MEDICARE

## 2023-07-24 ENCOUNTER — APPOINTMENT (OUTPATIENT)
Dept: ULTRASOUND IMAGING | Facility: CLINIC | Age: 63
End: 2023-07-24
Payer: MEDICARE

## 2023-07-24 ENCOUNTER — RESULT REVIEW (OUTPATIENT)
Age: 63
End: 2023-07-24

## 2023-07-24 LAB — A1CG - A1C WITH ESTIMATED AVERAGE GLUCOSE: 5.6

## 2023-07-24 PROCEDURE — 71250 CT THORAX DX C-: CPT | Mod: MH

## 2023-07-24 PROCEDURE — 76770 US EXAM ABDO BACK WALL COMP: CPT

## 2023-08-03 ENCOUNTER — APPOINTMENT (OUTPATIENT)
Dept: PULMONOLOGY | Facility: CLINIC | Age: 63
End: 2023-08-03
Payer: MEDICARE

## 2023-08-03 VITALS — BODY MASS INDEX: 30.48 KG/M2 | HEIGHT: 72 IN | WEIGHT: 225 LBS

## 2023-08-03 VITALS — SYSTOLIC BLOOD PRESSURE: 116 MMHG | DIASTOLIC BLOOD PRESSURE: 70 MMHG

## 2023-08-03 VITALS — HEART RATE: 85 BPM | OXYGEN SATURATION: 98 %

## 2023-08-03 PROCEDURE — 99214 OFFICE O/P EST MOD 30 MIN: CPT | Mod: 25

## 2023-08-03 PROCEDURE — 94729 DIFFUSING CAPACITY: CPT

## 2023-08-03 PROCEDURE — 94010 BREATHING CAPACITY TEST: CPT

## 2023-08-03 PROCEDURE — 85018 HEMOGLOBIN: CPT | Mod: QW

## 2023-08-03 PROCEDURE — 94727 GAS DIL/WSHOT DETER LNG VOL: CPT

## 2023-08-03 RX ORDER — ALBUTEROL SULFATE 90 UG/1
108 (90 BASE) INHALANT RESPIRATORY (INHALATION)
Qty: 1 | Refills: 3 | Status: ACTIVE | COMMUNITY
Start: 2023-08-03 | End: 1900-01-01

## 2023-08-03 NOTE — END OF VISIT
[Time Spent: ___ minutes] : I have spent [unfilled] minutes of time on the encounter. [TextEntry] : Discussed with pt at length regarding SOB, possible COPD/asthma vs RADS, ABBIE, prior cardiac hx/w/u; reviewed prior w/u with pt as above

## 2023-08-03 NOTE — CONSULT LETTER
[Dear  ___] : Dear  [unfilled], [Consult Letter:] : I had the pleasure of evaluating your patient, [unfilled]. [Please see my note below.] : Please see my note below. [Consult Closing:] : Thank you very much for allowing me to participate in the care of this patient.  If you have any questions, please do not hesitate to contact me. [Sincerely,] : Sincerely, [DrIvan  ___] : Dr. HARRIS [FreeTextEntry3] : Romain Vega MD, FCCP, D. ABSM\par  Pulmonary and Sleep Medicine\par  [facility]monary [facility] at [place]

## 2023-08-03 NOTE — RESULTS/DATA
[TextEntry] : Chest CT from 8/27/21 was essentially clear with 2 small 3 mm nodules  CXRs from 8/13/21 and 8/30/21 were clear x 2 Chest CTA from 8/31/21 was negative for PE or other acute pulmonary process; chronic minimal scarring in LLL was reportedly unchanged from 2015 Echo from 8/14/21 revealed a normal EF without evidence of pulm HTN  PSG from 9/22/21 revealed severe ABBIE with an AHI of 42.1  The patient's overall compliance is 100% with a >4hr compliance of 100% on autoCPAP since receiving the machine with a median and max pressure of 4.7-6.8 and 7.4-10.1 cm of water, respectively with a residual AHI of 0.6-1.6 which is normal

## 2023-08-03 NOTE — DISCUSSION/SUMMARY
[FreeTextEntry1] : #1. PFTs performed previously were essentially normal with a mildly reduced FVC and TLC but then normal PFTs with 30 lb weight loss and Breo 200; now with normal spirometry on Breo 100 so will try to decrease further to Arnuity 100 #2. The patient does not appear to require chronic BD therapy at this time but has been on Breo 200 though given normal lung function so decreased to Breo 100 without deterioration; continue to decrease therapy as tolerates when pt comfortable #3. SOBOE is likely related to weight or deconditioning given normal PFTs #4. Diet and exercise for weight loss  #5. Continue autoCPAP to treat severe ABBIE with an AHI of 42.1; encouraged compliance; The patient is using and benefitting from CPAP therapy  #6. Replace PAP equipment as needed; ordered 8/3/23  #7. Prior lung imaging studies were unremarkable but had CT in 4/2022 which revealed effusions and consolidations which appear resolved on current study from 7/24/23 #8. Cardiology f/u to optimize cardiac function.  #9. Pt had both Covid vaccines and booster; s/p flu vaccine #10. F/u 3 months with compliance and chao on Arnuity 100  The patient expressed understanding and agreement with the above recommendations/plan and accepts responsibility to be compliant with recommended testing, therapies, and f/u visits. All relevant questions and concerns were addressed.

## 2023-08-03 NOTE — HISTORY OF PRESENT ILLNESS
[Mild Persistent] : mild persistent [Dyspnea on Exertion] : dyspnea on exertion [Inhaled Short-Acting Beta-2 Agonists] : inhaled short-acting beta-2 agonists [Inhaled Long-Acting Beta-2 Agonists] : inhaled long-acting beta-2 agonists [Inhaled Corticosteroids] : inhaled corticosteroids [Leukotriene Modifiers] : leukotriene modifiers [Follow-Up - Routine Clinic] : a routine clinic follow-up of [Excess Weight] : excess weight [Currently Experiencing] : The patient is currently experiencing symptoms. [Dyspnea] : dyspnea [Low Calorie Diet] : low calorie diet [Good Compliance] : good compliance with treatment [Good Tolerance] : good tolerance of treatment [Good Symptom Control] : good symptom control [Sleep Apnea] : sleep apnea [High] : high [Low Calorie] : low calorie [Well Balanced Diet] : well balanced meals [Infrequently] : exercises infrequently [Walking] : walking [TextBox_4] : The patient gives a h/o possible asthma vs COPD or reactive airways disease for which he has been maintained on Symbicort/Breo and on Montelukast therapy. \par  He is followed with cardiology for reported nonischemic CM though now with normal EF, s/p AICD for reported ventricular arrhythmia, and h/o pericarditis for which he is on Colchicine. \par  Pt previously with increased SOB and was seen by his PCP/pulm, cardiology and admitted to [facility] from 8/30/21-9/3/21 but w/u including CXR and CTA were unremarkable. He was d/c'd as a COPD exacerbation.\par  He is a former smoker of 1 ppd x 4 years, quit 1984. [de-identified] : AutoCPAP [On ___] : performed on [unfilled] [Patient] : the patient [Indication ___] : for an indication of [unfilled] [None] : no new symptoms reported [FreeTextEntry9] : Chest CT [FreeTextEntry8] : Small b/l effusions with atelectasis and ? infiltrate/consolidation [TextEntry] : Chest CT from 7/24/23 revealed resolution of prior atelectasis/consolidations and effusions - reviewed results and films with patient

## 2023-08-04 ENCOUNTER — APPOINTMENT (OUTPATIENT)
Dept: CARDIOLOGY | Facility: CLINIC | Age: 63
End: 2023-08-04
Payer: MEDICARE

## 2023-08-04 VITALS
WEIGHT: 220 LBS | BODY MASS INDEX: 29.8 KG/M2 | RESPIRATION RATE: 16 BRPM | HEART RATE: 82 BPM | SYSTOLIC BLOOD PRESSURE: 118 MMHG | DIASTOLIC BLOOD PRESSURE: 84 MMHG | HEIGHT: 72 IN

## 2023-08-04 DIAGNOSIS — Z95.810 PRESENCE OF AUTOMATIC (IMPLANTABLE) CARDIAC DEFIBRILLATOR: ICD-10-CM

## 2023-08-04 PROCEDURE — 93000 ELECTROCARDIOGRAM COMPLETE: CPT

## 2023-08-04 PROCEDURE — 99214 OFFICE O/P EST MOD 30 MIN: CPT

## 2023-08-08 NOTE — HISTORY OF PRESENT ILLNESS
[FreeTextEntry1] : Mr. Giles presents today without complaints of chest pain or shortness of breath. He continues to be bothered by GI issues which he attributes to his underlying Crohn's disease.

## 2023-08-08 NOTE — ASSESSMENT
[FreeTextEntry1] :  1. EKG today reveals normal sinus rhythm at 82 bpm. Normal intervals. Left anterior hemiblock with left axis deviation. No ischemic changes.   2. Hypertension: Blood pressure well controlled at this time on current medications. A low-salt diet advised.   3. Hyperlipidemia:  Review of recent bloodwork demonstrates a total cholesterol of 160, HDL 40, LDL 10, triglycerides 56. Patient advised on a strict low-fat / low-cholesterol diet as well as continuation of Atorvastatin therapy.   4. Non-ischemic/dilated cardiomyopathy: Clinically stable without chest pain or shortness of breath. Recent echocardiography revealed mild dilatation of the left ventricular cavity as well as low-normal systolic function with an ejection fraction estimated between 50 and 55%. Mild left ventricular hypertrophy is noted. The left atrium is moderately enlarged while the right atrium is mildly enlarged. Moderate enlargement of the right ventricular cavity is noted. Right ventricular function preserved. Trace aortic insufficiency, trace mitral regurgitation as well as trace tricuspid and pulmonic insufficiency noted. Pulmonary pressures within normal limits. Mild dilatation of the ascending aorta with a diameter of 4.2 cm. Recent chest CT scan demonstrates a diameter of the ascending aorta measuring approximately 4.4 cm. Will continue to monitor closely. Patient advised to avoid excessive heavy lifting and maintain normotension.   5. Paroxysmal atrial fibrillation status-post ablation: Remains clinically stable in sinus rhythm. Patient's AICD assessment/interrogation being performed at [place]. If clinically stable, patient may return prior to his departure for [place] in December or upon return in May.

## 2023-08-09 ENCOUNTER — RX CHANGE (OUTPATIENT)
Age: 63
End: 2023-08-09

## 2023-08-09 RX ORDER — FLUTICASONE FUROATE AND VILANTEROL 100; 25 UG/1; UG/1
100-25 POWDER RESPIRATORY (INHALATION)
Qty: 3 | Refills: 3 | Status: DISCONTINUED | COMMUNITY
Start: 2023-07-13 | End: 2023-08-09

## 2023-11-17 ENCOUNTER — APPOINTMENT (OUTPATIENT)
Dept: PULMONOLOGY | Facility: CLINIC | Age: 63
End: 2023-11-17
Payer: MEDICARE

## 2023-11-17 VITALS
HEART RATE: 86 BPM | WEIGHT: 225 LBS | RESPIRATION RATE: 16 BRPM | DIASTOLIC BLOOD PRESSURE: 71 MMHG | HEIGHT: 72 IN | BODY MASS INDEX: 30.48 KG/M2 | OXYGEN SATURATION: 96 % | SYSTOLIC BLOOD PRESSURE: 124 MMHG

## 2023-11-17 VITALS — BODY MASS INDEX: 30.48 KG/M2 | HEIGHT: 72 IN | WEIGHT: 225 LBS

## 2023-11-17 DIAGNOSIS — Z01.811 ENCOUNTER FOR PREPROCEDURAL RESPIRATORY EXAMINATION: ICD-10-CM

## 2023-11-17 PROCEDURE — 99214 OFFICE O/P EST MOD 30 MIN: CPT | Mod: 25

## 2023-11-17 PROCEDURE — 94010 BREATHING CAPACITY TEST: CPT

## 2023-12-05 RX ORDER — OMEPRAZOLE 40 MG/1
40 CAPSULE, DELAYED RELEASE ORAL TWICE DAILY
Qty: 180 | Refills: 1 | Status: ACTIVE | COMMUNITY
Start: 1900-01-01 | End: 1900-01-01

## 2023-12-05 RX ORDER — DILTIAZEM HYDROCHLORIDE 120 MG/1
120 CAPSULE, EXTENDED RELEASE ORAL DAILY
Qty: 90 | Refills: 1 | Status: ACTIVE | COMMUNITY
Start: 2021-09-07 | End: 1900-01-01

## 2023-12-05 RX ORDER — COLCHICINE 0.6 MG/1
0.6 CAPSULE ORAL
Qty: 90 | Refills: 1 | Status: ACTIVE | COMMUNITY
Start: 2017-07-27 | End: 1900-01-01

## 2023-12-05 RX ORDER — HYDROCHLOROTHIAZIDE 25 MG/1
25 TABLET ORAL
Qty: 90 | Refills: 1 | Status: ACTIVE | COMMUNITY
Start: 2021-09-27 | End: 1900-01-01

## 2023-12-05 RX ORDER — ATORVASTATIN CALCIUM 10 MG/1
10 TABLET, FILM COATED ORAL
Qty: 90 | Refills: 1 | Status: ACTIVE | COMMUNITY
Start: 2021-08-26 | End: 1900-01-01

## 2023-12-05 RX ORDER — LOSARTAN POTASSIUM 50 MG/1
50 TABLET, FILM COATED ORAL TWICE DAILY
Qty: 180 | Refills: 1 | Status: ACTIVE | COMMUNITY
Start: 1900-01-01 | End: 1900-01-01

## 2023-12-05 RX ORDER — TAMSULOSIN HYDROCHLORIDE 0.4 MG/1
0.4 CAPSULE ORAL
Qty: 90 | Refills: 1 | Status: ACTIVE | COMMUNITY
Start: 1900-01-01 | End: 1900-01-01

## 2023-12-05 RX ORDER — APIXABAN 5 MG/1
5 TABLET, FILM COATED ORAL
Qty: 180 | Refills: 1 | Status: ACTIVE | COMMUNITY
Start: 2020-04-07 | End: 1900-01-01

## 2023-12-12 ENCOUNTER — APPOINTMENT (OUTPATIENT)
Dept: OPHTHALMOLOGY | Facility: CLINIC | Age: 63
End: 2023-12-12
Payer: MEDICARE

## 2023-12-12 ENCOUNTER — NON-APPOINTMENT (OUTPATIENT)
Age: 63
End: 2023-12-12

## 2023-12-12 PROCEDURE — 92014 COMPRE OPH EXAM EST PT 1/>: CPT

## 2023-12-14 ENCOUNTER — APPOINTMENT (OUTPATIENT)
Dept: PULMONOLOGY | Facility: CLINIC | Age: 63
End: 2023-12-14
Payer: MEDICARE

## 2023-12-14 VITALS — BODY MASS INDEX: 29.8 KG/M2 | HEIGHT: 72 IN | WEIGHT: 220 LBS

## 2023-12-14 VITALS
DIASTOLIC BLOOD PRESSURE: 78 MMHG | OXYGEN SATURATION: 98 % | RESPIRATION RATE: 16 BRPM | SYSTOLIC BLOOD PRESSURE: 120 MMHG | HEART RATE: 70 BPM

## 2023-12-14 PROCEDURE — 94010 BREATHING CAPACITY TEST: CPT

## 2023-12-14 PROCEDURE — 99214 OFFICE O/P EST MOD 30 MIN: CPT | Mod: 25

## 2023-12-14 RX ORDER — FLUTICASONE FUROATE 100 UG/1
100 POWDER RESPIRATORY (INHALATION) DAILY
Qty: 1 | Refills: 5 | Status: DISCONTINUED | COMMUNITY
Start: 2023-08-03 | End: 2023-12-14

## 2023-12-14 RX ORDER — METHYLPREDNISOLONE 4 MG/1
4 TABLET ORAL
Qty: 1 | Refills: 1 | Status: ACTIVE | COMMUNITY
Start: 2023-12-14 | End: 1900-01-01

## 2023-12-14 NOTE — PROCEDURE
[FreeTextEntry1] : PFTs 10/6/21 - Mildly reduced FVC and normal FEV1 without obstruction with mildly reduced lung volumes and normal diffusion capacity. PFTs 8/9/22 - normal on Breo 200. Spirometry 11/1/22 - normal on Breo 100. Chuck on 11/17/23 - normal with higher numbers than prior on his current Breztri.  Chuck 12/14/23 - normal on Breo 100 though slightly reduced c/w prior on Breztri.

## 2023-12-14 NOTE — HISTORY OF PRESENT ILLNESS
[Mild Persistent] : mild persistent [Dyspnea on Exertion] : dyspnea on exertion [Inhaled Short-Acting Beta-2 Agonists] : inhaled short-acting beta-2 agonists [Inhaled Long-Acting Beta-2 Agonists] : inhaled long-acting beta-2 agonists [Inhaled Corticosteroids] : inhaled corticosteroids [Leukotriene Modifiers] : leukotriene modifiers [On ___] : performed on [unfilled] [Patient] : the patient [Indication ___] : for an indication of [unfilled] [None] : no new symptoms reported [Follow-Up - Routine Clinic] : a routine clinic follow-up of [Excess Weight] : excess weight [Currently Experiencing] : The patient is currently experiencing symptoms. [Dyspnea] : dyspnea [Low Calorie Diet] : low calorie diet [Good Compliance] : good compliance with treatment [Good Tolerance] : good tolerance of treatment [Good Symptom Control] : good symptom control [Sleep Apnea] : sleep apnea [High] : high [Low Calorie] : low calorie [Well Balanced Diet] : well balanced meals [Infrequently] : exercises infrequently [Walking] : walking [TextBox_4] : He is a former smoker of 1 ppd x 4 years, quit 1984. ? h/o asthma vs COPD or reactive airways disease for which he has been maintained on Symbicort/Breo and on Montelukast therapy.  He is followed with cardiology for reported nonischemic CM though now with normal EF, s/p AICD for reported ventricular arrhythmia, and h/o pericarditis for which he is on Colchicine.  Pt previously with increased SOB and was seen by his PCP/pulm, cardiology and admitted to [facility] from 8/30/21-9/3/21 but w/u including CXR and CTA were unremarkable. He was d/c'd as a COPD exacerbation. Pt is now doing well. Was on Breo 200 and now on Breo 100. [de-identified] : AutoCPAP [FreeTextEntry9] : Chest CT [FreeTextEntry8] : Small b/l effusions with atelectasis and ? infiltrate/consolidation [TextEntry] : Chest CT from 7/24/23 revealed resolution of prior atelectasis/consolidations and effusions - reviewed results and films with patient

## 2023-12-14 NOTE — END OF VISIT
[Time Spent: ___ minutes] : I have spent [unfilled] minutes of time on the encounter. [TextEntry] : Discussed with pt at length regarding SOB, possible COPD/asthma vs RADS, ABBIE, prior cardiac hx reviewed prior w/u with pt as above

## 2023-12-14 NOTE — RESULTS/DATA
[TextEntry] : Chest CT from 8/27/21 was essentially clear with 2 small 3 mm nodules. CXRs from 8/13/21 and 8/30/21 were clear x 2. Chest CTA from 8/31/21 was negative for PE or other acute pulmonary process; chronic minimal scarring in LLL was reportedly unchanged from 2015. Echo from 8/14/21 revealed a normal EF without evidence of pulm HTN. PSG from 9/22/21 revealed severe ABBIE with an AHI of 42.1. The patient's overall compliance is 100% with a >4hr compliance of 100% on autoCPAP since receiving the machine with a median and max pressure of 4.7-6.8 and 7.4-10.1 cm of water, respectively with a residual AHI of 0.6-1.6 which is normal. He is now less compliant but will try to improve.

## 2023-12-14 NOTE — DISCUSSION/SUMMARY
[FreeTextEntry1] : #1. PFTs performed previously were essentially normal with a mildly reduced FVC and TLC but then normal PFTs with 30 lb weight loss and Breo 200. He had normal spirometry on Breo 100 so pt was to decrease further to Arnuity 100 but did not so so but rather was on Breztri after URI. He is now doing well on Breo 100. #2. The patient may not require chronic BD therapy but has been on Breo 200 though given normal lung function so decreased to Breo 100 without deterioration; continue to decrease therapy as tolerates. Consider Arnuity if tolerates. #3. SOBOE is likely related to weight or deconditioning given normal PFTs. #4. Diet and exercise for weight loss. #5. Continue autoCPAP to treat severe ABBIE with an AHI of 42.1; encouraged compliance; The patient is using and benefitting from CPAP therapy. Encouraged improved compliance. #6. Replace PAP equipment as needed; ordered 8/3/23. #7. Prior lung imaging studies were unremarkable but had CT in 4/2022 which revealed effusions and consolidations which appear resolved on last study from 7/24/23. #8. Cardiology f/u to optimize cardiac function.  #9. Pt had both Covid vaccines and booster; s/p flu vaccine. #10. F/u 4-5 months on return from [place] with compliance.  The patient expressed understanding and agreement with the above recommendations/plan and accepts responsibility to be compliant with recommended testing, therapies, and f/u visits. All relevant questions and concerns were addressed.

## 2023-12-26 ENCOUNTER — NON-APPOINTMENT (OUTPATIENT)
Age: 63
End: 2023-12-26

## 2023-12-27 ENCOUNTER — APPOINTMENT (OUTPATIENT)
Dept: CARDIOLOGY | Facility: CLINIC | Age: 63
End: 2023-12-27
Payer: MEDICARE

## 2023-12-27 ENCOUNTER — NON-APPOINTMENT (OUTPATIENT)
Age: 63
End: 2023-12-27

## 2023-12-27 ENCOUNTER — EMERGENCY (EMERGENCY)
Facility: HOSPITAL | Age: 63
LOS: 1 days | Discharge: HOME | End: 2023-12-27
Attending: STUDENT IN AN ORGANIZED HEALTH CARE EDUCATION/TRAINING PROGRAM
Payer: MEDICARE

## 2023-12-27 VITALS
SYSTOLIC BLOOD PRESSURE: 144 MMHG | DIASTOLIC BLOOD PRESSURE: 82 MMHG | WEIGHT: 214 LBS | BODY MASS INDEX: 28.99 KG/M2 | HEART RATE: 70 BPM | RESPIRATION RATE: 16 BRPM | HEIGHT: 72 IN

## 2023-12-27 VITALS
DIASTOLIC BLOOD PRESSURE: 84 MMHG | RESPIRATION RATE: 20 BRPM | HEART RATE: 73 BPM | OXYGEN SATURATION: 98 % | TEMPERATURE: 98 F | SYSTOLIC BLOOD PRESSURE: 138 MMHG

## 2023-12-27 VITALS
WEIGHT: 216.93 LBS | DIASTOLIC BLOOD PRESSURE: 96 MMHG | SYSTOLIC BLOOD PRESSURE: 159 MMHG | OXYGEN SATURATION: 97 % | TEMPERATURE: 99 F | HEIGHT: 73 IN | HEART RATE: 74 BPM | RESPIRATION RATE: 20 BRPM

## 2023-12-27 DIAGNOSIS — Z95.810 PRESENCE OF AUTOMATIC (IMPLANTABLE) CARDIAC DEFIBRILLATOR: Chronic | ICD-10-CM

## 2023-12-27 DIAGNOSIS — E78.00 PURE HYPERCHOLESTEROLEMIA, UNSPECIFIED: ICD-10-CM

## 2023-12-27 DIAGNOSIS — Z90.49 ACQUIRED ABSENCE OF OTHER SPECIFIED PARTS OF DIGESTIVE TRACT: Chronic | ICD-10-CM

## 2023-12-27 DIAGNOSIS — R07.9 CHEST PAIN, UNSPECIFIED: ICD-10-CM

## 2023-12-27 DIAGNOSIS — I10 ESSENTIAL (PRIMARY) HYPERTENSION: ICD-10-CM

## 2023-12-27 DIAGNOSIS — I42.0 DILATED CARDIOMYOPATHY: ICD-10-CM

## 2023-12-27 DIAGNOSIS — I77.810 THORACIC AORTIC ECTASIA: ICD-10-CM

## 2023-12-27 LAB
ALBUMIN SERPL ELPH-MCNC: 4.5 G/DL — SIGNIFICANT CHANGE UP (ref 3.3–5.2)
ALBUMIN SERPL ELPH-MCNC: 4.5 G/DL — SIGNIFICANT CHANGE UP (ref 3.3–5.2)
ALP SERPL-CCNC: 46 U/L — SIGNIFICANT CHANGE UP (ref 40–120)
ALP SERPL-CCNC: 46 U/L — SIGNIFICANT CHANGE UP (ref 40–120)
ALT FLD-CCNC: 45 U/L — HIGH
ALT FLD-CCNC: 45 U/L — HIGH
ANION GAP SERPL CALC-SCNC: 11 MMOL/L — SIGNIFICANT CHANGE UP (ref 5–17)
ANION GAP SERPL CALC-SCNC: 11 MMOL/L — SIGNIFICANT CHANGE UP (ref 5–17)
APTT BLD: 28.4 SEC — SIGNIFICANT CHANGE UP (ref 24.5–35.6)
APTT BLD: 28.4 SEC — SIGNIFICANT CHANGE UP (ref 24.5–35.6)
AST SERPL-CCNC: 34 U/L — SIGNIFICANT CHANGE UP
AST SERPL-CCNC: 34 U/L — SIGNIFICANT CHANGE UP
BASOPHILS # BLD AUTO: 0 K/UL — SIGNIFICANT CHANGE UP (ref 0–0.2)
BASOPHILS # BLD AUTO: 0 K/UL — SIGNIFICANT CHANGE UP (ref 0–0.2)
BASOPHILS NFR BLD AUTO: 0 % — SIGNIFICANT CHANGE UP (ref 0–2)
BASOPHILS NFR BLD AUTO: 0 % — SIGNIFICANT CHANGE UP (ref 0–2)
BILIRUB SERPL-MCNC: 0.6 MG/DL — SIGNIFICANT CHANGE UP (ref 0.4–2)
BILIRUB SERPL-MCNC: 0.6 MG/DL — SIGNIFICANT CHANGE UP (ref 0.4–2)
BUN SERPL-MCNC: 16.7 MG/DL — SIGNIFICANT CHANGE UP (ref 8–20)
BUN SERPL-MCNC: 16.7 MG/DL — SIGNIFICANT CHANGE UP (ref 8–20)
CALCIUM SERPL-MCNC: 10.1 MG/DL — SIGNIFICANT CHANGE UP (ref 8.4–10.5)
CALCIUM SERPL-MCNC: 10.1 MG/DL — SIGNIFICANT CHANGE UP (ref 8.4–10.5)
CHLORIDE SERPL-SCNC: 102 MMOL/L — SIGNIFICANT CHANGE UP (ref 96–108)
CHLORIDE SERPL-SCNC: 102 MMOL/L — SIGNIFICANT CHANGE UP (ref 96–108)
CO2 SERPL-SCNC: 29 MMOL/L — SIGNIFICANT CHANGE UP (ref 22–29)
CO2 SERPL-SCNC: 29 MMOL/L — SIGNIFICANT CHANGE UP (ref 22–29)
CREAT SERPL-MCNC: 0.75 MG/DL — SIGNIFICANT CHANGE UP (ref 0.5–1.3)
CREAT SERPL-MCNC: 0.75 MG/DL — SIGNIFICANT CHANGE UP (ref 0.5–1.3)
EGFR: 100 ML/MIN/1.73M2 — SIGNIFICANT CHANGE UP
EGFR: 100 ML/MIN/1.73M2 — SIGNIFICANT CHANGE UP
EOSINOPHIL # BLD AUTO: 0 K/UL — SIGNIFICANT CHANGE UP (ref 0–0.5)
EOSINOPHIL # BLD AUTO: 0 K/UL — SIGNIFICANT CHANGE UP (ref 0–0.5)
EOSINOPHIL NFR BLD AUTO: 0 % — SIGNIFICANT CHANGE UP (ref 0–6)
EOSINOPHIL NFR BLD AUTO: 0 % — SIGNIFICANT CHANGE UP (ref 0–6)
GLUCOSE SERPL-MCNC: 97 MG/DL — SIGNIFICANT CHANGE UP (ref 70–99)
GLUCOSE SERPL-MCNC: 97 MG/DL — SIGNIFICANT CHANGE UP (ref 70–99)
HCT VFR BLD CALC: 41.4 % — SIGNIFICANT CHANGE UP (ref 39–50)
HCT VFR BLD CALC: 41.4 % — SIGNIFICANT CHANGE UP (ref 39–50)
HGB BLD-MCNC: 12.7 G/DL — LOW (ref 13–17)
HGB BLD-MCNC: 12.7 G/DL — LOW (ref 13–17)
INR BLD: 1.14 RATIO — SIGNIFICANT CHANGE UP (ref 0.85–1.18)
INR BLD: 1.14 RATIO — SIGNIFICANT CHANGE UP (ref 0.85–1.18)
LYMPHOCYTES # BLD AUTO: 1.28 K/UL — SIGNIFICANT CHANGE UP (ref 1–3.3)
LYMPHOCYTES # BLD AUTO: 1.28 K/UL — SIGNIFICANT CHANGE UP (ref 1–3.3)
LYMPHOCYTES # BLD AUTO: 12.3 % — LOW (ref 13–44)
LYMPHOCYTES # BLD AUTO: 12.3 % — LOW (ref 13–44)
MAGNESIUM SERPL-MCNC: 2 MG/DL — SIGNIFICANT CHANGE UP (ref 1.6–2.6)
MAGNESIUM SERPL-MCNC: 2 MG/DL — SIGNIFICANT CHANGE UP (ref 1.6–2.6)
MCHC RBC-ENTMCNC: 19.2 PG — LOW (ref 27–34)
MCHC RBC-ENTMCNC: 19.2 PG — LOW (ref 27–34)
MCHC RBC-ENTMCNC: 30.7 GM/DL — LOW (ref 32–36)
MCHC RBC-ENTMCNC: 30.7 GM/DL — LOW (ref 32–36)
MCV RBC AUTO: 62.5 FL — LOW (ref 80–100)
MCV RBC AUTO: 62.5 FL — LOW (ref 80–100)
MONOCYTES # BLD AUTO: 0.64 K/UL — SIGNIFICANT CHANGE UP (ref 0–0.9)
MONOCYTES # BLD AUTO: 0.64 K/UL — SIGNIFICANT CHANGE UP (ref 0–0.9)
MONOCYTES NFR BLD AUTO: 6.1 % — SIGNIFICANT CHANGE UP (ref 2–14)
MONOCYTES NFR BLD AUTO: 6.1 % — SIGNIFICANT CHANGE UP (ref 2–14)
NEUTROPHILS # BLD AUTO: 7.79 K/UL — HIGH (ref 1.8–7.4)
NEUTROPHILS # BLD AUTO: 7.79 K/UL — HIGH (ref 1.8–7.4)
NEUTROPHILS NFR BLD AUTO: 74.6 % — SIGNIFICANT CHANGE UP (ref 43–77)
NEUTROPHILS NFR BLD AUTO: 74.6 % — SIGNIFICANT CHANGE UP (ref 43–77)
NT-PROBNP SERPL-SCNC: <36 PG/ML — SIGNIFICANT CHANGE UP (ref 0–300)
NT-PROBNP SERPL-SCNC: <36 PG/ML — SIGNIFICANT CHANGE UP (ref 0–300)
PLATELET # BLD AUTO: 199 K/UL — SIGNIFICANT CHANGE UP (ref 150–400)
PLATELET # BLD AUTO: 199 K/UL — SIGNIFICANT CHANGE UP (ref 150–400)
POTASSIUM SERPL-MCNC: 3.9 MMOL/L — SIGNIFICANT CHANGE UP (ref 3.5–5.3)
POTASSIUM SERPL-MCNC: 3.9 MMOL/L — SIGNIFICANT CHANGE UP (ref 3.5–5.3)
POTASSIUM SERPL-SCNC: 3.9 MMOL/L — SIGNIFICANT CHANGE UP (ref 3.5–5.3)
POTASSIUM SERPL-SCNC: 3.9 MMOL/L — SIGNIFICANT CHANGE UP (ref 3.5–5.3)
PROT SERPL-MCNC: 7.1 G/DL — SIGNIFICANT CHANGE UP (ref 6.6–8.7)
PROT SERPL-MCNC: 7.1 G/DL — SIGNIFICANT CHANGE UP (ref 6.6–8.7)
PROTHROM AB SERPL-ACNC: 12.6 SEC — SIGNIFICANT CHANGE UP (ref 9.5–13)
PROTHROM AB SERPL-ACNC: 12.6 SEC — SIGNIFICANT CHANGE UP (ref 9.5–13)
RBC # BLD: 6.62 M/UL — HIGH (ref 4.2–5.8)
RBC # BLD: 6.62 M/UL — HIGH (ref 4.2–5.8)
RBC # FLD: 18.1 % — HIGH (ref 10.3–14.5)
RBC # FLD: 18.1 % — HIGH (ref 10.3–14.5)
SODIUM SERPL-SCNC: 142 MMOL/L — SIGNIFICANT CHANGE UP (ref 135–145)
SODIUM SERPL-SCNC: 142 MMOL/L — SIGNIFICANT CHANGE UP (ref 135–145)
TROPONIN T, HIGH SENSITIVITY RESULT: 14 NG/L — SIGNIFICANT CHANGE UP (ref 0–51)
TROPONIN T, HIGH SENSITIVITY RESULT: 14 NG/L — SIGNIFICANT CHANGE UP (ref 0–51)
TROPONIN T, HIGH SENSITIVITY RESULT: 15 NG/L — SIGNIFICANT CHANGE UP (ref 0–51)
TROPONIN T, HIGH SENSITIVITY RESULT: 15 NG/L — SIGNIFICANT CHANGE UP (ref 0–51)
WBC # BLD: 10.44 K/UL — SIGNIFICANT CHANGE UP (ref 3.8–10.5)
WBC # BLD: 10.44 K/UL — SIGNIFICANT CHANGE UP (ref 3.8–10.5)
WBC # FLD AUTO: 10.44 K/UL — SIGNIFICANT CHANGE UP (ref 3.8–10.5)
WBC # FLD AUTO: 10.44 K/UL — SIGNIFICANT CHANGE UP (ref 3.8–10.5)

## 2023-12-27 PROCEDURE — 86901 BLOOD TYPING SEROLOGIC RH(D): CPT

## 2023-12-27 PROCEDURE — 36415 COLL VENOUS BLD VENIPUNCTURE: CPT

## 2023-12-27 PROCEDURE — 71275 CT ANGIOGRAPHY CHEST: CPT | Mod: MA

## 2023-12-27 PROCEDURE — 83880 ASSAY OF NATRIURETIC PEPTIDE: CPT

## 2023-12-27 PROCEDURE — 93000 ELECTROCARDIOGRAM COMPLETE: CPT

## 2023-12-27 PROCEDURE — G1004: CPT

## 2023-12-27 PROCEDURE — 93005 ELECTROCARDIOGRAM TRACING: CPT

## 2023-12-27 PROCEDURE — 86850 RBC ANTIBODY SCREEN: CPT

## 2023-12-27 PROCEDURE — 80053 COMPREHEN METABOLIC PANEL: CPT

## 2023-12-27 PROCEDURE — 86900 BLOOD TYPING SEROLOGIC ABO: CPT

## 2023-12-27 PROCEDURE — 99284 EMERGENCY DEPT VISIT MOD MDM: CPT

## 2023-12-27 PROCEDURE — 99285 EMERGENCY DEPT VISIT HI MDM: CPT

## 2023-12-27 PROCEDURE — 99215 OFFICE O/P EST HI 40 MIN: CPT

## 2023-12-27 PROCEDURE — 84484 ASSAY OF TROPONIN QUANT: CPT

## 2023-12-27 PROCEDURE — 93010 ELECTROCARDIOGRAM REPORT: CPT

## 2023-12-27 PROCEDURE — 99285 EMERGENCY DEPT VISIT HI MDM: CPT | Mod: 25

## 2023-12-27 PROCEDURE — 85730 THROMBOPLASTIN TIME PARTIAL: CPT

## 2023-12-27 PROCEDURE — 74174 CTA ABD&PLVS W/CONTRAST: CPT | Mod: 26,MG

## 2023-12-27 PROCEDURE — 74174 CTA ABD&PLVS W/CONTRAST: CPT | Mod: MG

## 2023-12-27 PROCEDURE — 85610 PROTHROMBIN TIME: CPT

## 2023-12-27 PROCEDURE — 71275 CT ANGIOGRAPHY CHEST: CPT | Mod: 26,MA

## 2023-12-27 PROCEDURE — 83735 ASSAY OF MAGNESIUM: CPT

## 2023-12-27 PROCEDURE — 85025 COMPLETE CBC W/AUTO DIFF WBC: CPT

## 2023-12-27 RX ORDER — ACETAMINOPHEN 500 MG
1000 TABLET ORAL ONCE
Refills: 0 | Status: COMPLETED | OUTPATIENT
Start: 2023-12-27 | End: 2023-12-27

## 2023-12-27 NOTE — ED ADULT TRIAGE NOTE - BP NONINVASIVE SYSTOLIC (MM HG)
-- Message is from the [facility]--    Provider paged via [employer] Documentation - The below message was copied and pasted from a [employer] page:    2019 9:48:18 AM      Initiated: 2019 9:40:57 AM Message Sent: 2019 9:48:18 AM   [facility] Contact CenterACC NURSE   Lizette Soares 0\" tooltip-popup-delay=\"500\" njrkosl-ivhfzr-qe-body=\"true\" uib-tooltip=\"\" tooltip-placement=\"auto top\" tooltip-enable=\"false\" psx-ellipsis=\"\"Ezra Pino   Secure Text   [phone] PATIENT NUMBER -------------------------------- ACC NURSE LINE (IF QUESTIONS ONLY - [phone]) URGENT FROM: DEYANIRA DELGADO RN ACC DR. DR. EZRA PINO NOTIFICATION PER ACC PROTOCOL OF DECLINED ED DISPOSITION FOR ELEVATED BLOOD SUGAR, COLD SYMPTOMS WITH PRODUCTIVE COUGH AND WHEEZING - CURRENTLY, WITH SWELLING TO HANDS AND FEET. PATIENT/PATIENT SON REQUEST APPOINTMENT WITH PROVIDER AT PRIMARY SITE ONLY. NO AVAILABLE APPOINTMENTS NOTED TODAY, SCHEDULED FOR 2019 AT 1000 WITH DR. LIZETTE SOARES AT [facility]. PATIENT - NARAYAN SPEAR, : 1922 SON' Nasir RECIO'S CALL BACK NUMBER LISTED ABOVE. THANK YOU. ACLE*      
Pt has appt at 1.22.19 but cnacelled  Due to pt is inpatient  
159

## 2023-12-27 NOTE — ED PROVIDER NOTE - CARE PROVIDER_API CALL
Adonis Hector  Cardiology  [address] [place] [address]  Phone: [phone]  Fax: [phone]  Follow Up Time: 1-3 Days   Adonis Hector  Cardiology  [address] [place] [address]  Phone: [phone]  Fax: [phone]  Follow Up Time: 1-3 Days

## 2023-12-27 NOTE — ED PROVIDER NOTE - OBJECTIVE STATEMENT
Pt is a 64 yo M co chest pain.  PMHx significant for htn, hld, atrial tachycardia with ppm on eliquis, thoracic aortic aneurysm.  Pt states that this morning he had onset of sharp L chest pain that radiated to the back, L shoulder and down L arm. Pt states that the pain was moderate. Pt states that he had similar pain 2 months ago.  Pt went to dr. palma this morning who sent him to the ER for evaluation of possible aortic dissection.  According to dr. palma patient had a normal cardiac cath in 2021.  no sob. no n/v. no fever/chills. noother complaints. Pt is a 62 yo M co chest pain.  PMHx significant for htn, hld, atrial tachycardia with ppm on eliquis, thoracic aortic aneurysm.  Pt states that this morning he had onset of sharp L chest pain that radiated to the back, L shoulder and down L arm. Pt states that the pain was moderate. Pt states that he had similar pain 2 months ago.  Pt went to dr. palma this morning who sent him to the ER for evaluation of possible aortic dissection.  According to dr. palma patient had a normal cardiac cath in 2021.  no sob. no n/v. no fever/chills. noother complaints.

## 2023-12-27 NOTE — ED PROVIDER NOTE - PATIENT PORTAL LINK FT
You can access the FollowMyHealth Patient Portal offered by [facility] by registering at the following website: [email or web address]. By joining [employer]’s FollowMyHealth portal, you will also be able to view your health information using other applications (apps) compatible with our system. You can access the FollowMyHealth Patient Portal offered by [facility] by registering at the following website: [email or web address]. By joining [employer]’s FollowMyHealth portal, you will also be able to view your health information using other applications (apps) compatible with our system.

## 2023-12-27 NOTE — ED PROVIDER NOTE - CARE PROVIDERS DIRECT ADDRESSES
[email or web address]

## 2023-12-27 NOTE — ED ADULT NURSE NOTE - OBJECTIVE STATEMENT
Pt A&Ox4, NAD, Pt sent in from cardio to r/o aortic dissection. Pt with complaints of intermittent CP.- Hx of enlarged aorta . Pt denies any active chest pains at present

## 2023-12-27 NOTE — ED PROVIDER NOTE - CLINICAL SUMMARY MEDICAL DECISION MAKING FREE TEXT BOX
labs and iamging reviewed. CTA neg for dissection.  I spoke with Dr. mack who agrees very atypical chest pain unlikely ACS. delta trop neg.  Pt reassured and instructed to f/up with zohreh mensah as an outpatient. instructed to return for any new/concerning symptoms.  Pt given a copy of all results and instructed to f/up with pcp regarding any abnormal results.

## 2023-12-27 NOTE — PHYSICAL EXAM
[Well Developed] : well developed [No Acute Distress] : no acute distress [Obese] : obese [Normal Conjunctiva] : normal conjunctiva [Normal Venous Pressure] : normal venous pressure [No Carotid Bruit] : no carotid bruit [Normal S1, S2] : normal S1, S2 [No Murmur] : no murmur [No Rub] : no rub [No Gallop] : no gallop [No Respiratory Distress] : no respiratory distress  [Wheeze ____] : wheeze [unfilled] [Normal Bowel Sounds] : normal bowel sounds [Normal Gait] : normal gait [No Edema] : no edema [No Rash] : no rash [Moves all extremities] : moves all extremities [No Focal Deficits] : no focal deficits [Normal Speech] : normal speech [Alert and Oriented] : alert and oriented [Normal memory] : normal memory

## 2023-12-27 NOTE — ED ADULT TRIAGE NOTE - CHIEF COMPLAINT QUOTE
Pt A&Ox4, NAD, Pt sent in from cardio to r/o aortic dissection. Pt with complaints of intermittent CP.

## 2023-12-27 NOTE — CONSULT NOTE ADULT - SUBJECTIVE AND OBJECTIVE BOX
CHIEF COMPLAINT: chest pain    HPI: Patient is a  63y Male with NICM s/p ICD, VT, COPD, obesity, normal coronary arteries on cath 2021, thoracic aortic ectasia here with chest pain, sent in from office today.     PAST MEDICAL & SURGICAL HISTORY:  Asthma      Bronchitis      COPD (chronic obstructive pulmonary disease)      Mediterranean anemia      Pacemaker      Paroxysmal atrial fibrillation      Aortic aneurysm      Atrial fibrillation      Cardiomyopathy      Hypertension      Ventricular tachycardia      Enlarged prostate      Pericarditis      Crohns disease      Crohn disease      History of appendectomy      History of cholecystectomy      AICD (automatic cardioverter/defibrillator) present        Current Meds  ?? Albuterol Sulfate  (90 Base) MCG/ACT Inhalation Aerosol Solution; inhale 2 puffs  by mouth every 4 to 6 hours if needed  ?? Albuterol-Ipratropium 2.5-0.5 MG/3ML SOLN  ?? ALPRAZolam 0.25 MG Oral Tablet; One tab BID prn anxiety MDD:0.5 TDD:0.5  ?? Atorvastatin Calcium 10 MG Oral Tablet; TAKE 1 TABLET BY MOUTH qod  ?? Breo Ellipta 100-25 MCG/ACT Inhalation Aerosol Powder Breath Activated; 1 PUFF(S)  INHALED ONCE A DAY  ?? Co Q-10 200 MG Oral Capsule  ?? Colchicine 0.6 MG Oral Capsule; TAKE 1 CAPSULE BY MOUTH DAILY  ?? dilTIAZem HCl  MG Oral Capsule Extended Release 24 Hour; TAKE 1 CAPSULE  ONCE DAILY  ?? Eliquis 5 MG Oral Tablet; TAKE 1 TABLET BY MOUTH TWICE DAILY  ?? Florastor CAPS  ?? Fluticasone Furoate-Vilanterol 200-25 MCG/ACT Inhalation Aerosol Powder Breath  Activated; TAKE 1 PUFF BY MOUTH EVERY DAY  ?? Fluticasone-Salmeterol 250-50 MCG/ACT Inhalation Aerosol Powder Breath Activated;  USE ONE INHALATION TWO TIMES A DAY. RINSE MOUTH AFTER USE  ?? hydroCHLOROthiazide 25 MG Oral Tablet; TAKE 1 TABLET BY MOUTH DAILY  ?? Losartan Potassium 50 MG Oral Tablet; TAKE 1 TABLET TWICE DAILY  ?? methylPREDNISolone 4 MG Oral Tablet Therapy Pack; TAKE 4 MG As Directed  ?? methylPREDNISolone TABS  ?? Montelukast Sodium 10 MG Oral Tablet; TAKE 1 TABLET BY MOUTH EVERY DAY  ?? Omega-3-acid Ethyl Esters 1 GM Oral Capsule; take 4 capsules by mouth daily  ?? Omeprazole 40 MG Oral Capsule Delayed Release; TAKE 1 CAPSULE TWICE DAILY  ?? Pentasa 500 MG Oral Capsule Extended Release; TAKE 2 CAPSULES 4 TIMES DAILY  ?? Tamsulosin HCl - 0.4 MG Oral Capsule; one daily as directed  ?? Turmeric TABS             FAMILY HISTORY:  FAMILY HISTORY:  FH: heart disease (Father)        SOCIAL HISTORY: no EtOH, drugs or tobacco    ROS: GI negative All others negative    PHYSCIAL EXAM:  Vital Signs Last 24 Hrs  T(C): 37.2 (27 Dec 2023 10:05), Max: 37.2 (27 Dec 2023 10:05)  T(F): 99 (27 Dec 2023 10:05), Max: 99 (27 Dec 2023 10:05)  HR: 74 (27 Dec 2023 10:05) (74 - 74)  BP: 139/83 (27 Dec 2023 12:36) (139/83 - 159/96)  BP(mean): --  RR: 20 (27 Dec 2023 10:05) (20 - 20)  SpO2: 97% (27 Dec 2023 10:05) (97% - 97%)    Parameters below as of 27 Dec 2023 10:05  Patient On (Oxygen Delivery Method): room air      I&O's Summary    GEN: NAD  HEENT: MMM, sclera anicteric  RESP: CTA bilaterally  CVS: S1S2, RRR, no JVD, no M/R/G  GI: Soft, NT, ND,obese  EXT: no C/C/E  NEURO: AAOX3  PSYCH: Normal affect    LABS:                        12.7   10.44 )-----------( 199      ( 27 Dec 2023 11:30 )             41.4     12-27    142  |  102  |  16.7  ----------------------------<  97  3.9   |  29.0  |  0.75    Ca    10.1      27 Dec 2023 11:30  Mg     2.0     12-27    TPro  7.1  /  Alb  4.5  /  TBili  0.6  /  DBili  x   /  AST  34  /  ALT  45<H>  /  AlkPhos  46  12-27        PT/INR - ( 27 Dec 2023 11:30 )   PT: 12.6 sec;   INR: 1.14 ratio         PTT - ( 27 Dec 2023 11:30 )  PTT:28.4 sec      ECHO 7/2023:  1. Mildly dilated left ventricular cavity size. The left ventricular systolic function is mildly decreased with an ejection fraction visually estimated at 50 to 55 %.  2. Mild left ventricular hypertrophy.  3. The left atrium is moderately dilated.  4. The interatrial septum appears intact.  5. The right atrium is mildly dilated.  6. Moderately enlarged right ventricular cavity size and normal right ventricular systolic function.  7. The aortic root and aortic arch appear normal in size. Mildly dilated proximal ascending aorta.  8. Mild aortic calcification seen in the aortic root.  9. Trace aortic regurgitation.  10. Trace mitral regurgitation.  11. Trace tricuspid regurgitation.  12. Trace pulmonic regurgitation.  13. The inferior vena cava is dilated measuring 2.70 cm in diameter, (dilated >2.1cm) with normal inspiratory collapse (normal >50%) consistent with mildly elevated right atrial pressure (\R\8, range 5-10mmHg).  14. Estimated pulmonary artery systolic pressure is 30 mmHg.  15. No pericardial effusion seen.    ECG: SR, LVH, leftward axis    Assessment:     Patient is a  63y Male with NICM (most recent EF 50-55%) s/p ICD, VT, COPD, obesity, normal coronary arteries on cath 2021, thoracic aortic ectasia here with chest pain, sent in from office today.   -BW unremarkable and ECG as well  -Known normal coronary arteries      Plan: (TO BE SEEN)   1. Recommend CTA chest to evaluate for disssection  2. Low suspicion CAD/ischemia  3. IF work up negative, can dc home and further outpatient follow up       Balaji Love MD CHIEF COMPLAINT: chest pain    HPI: Patient is a  63y Male with NICM s/p ICD, VT, COPD, obesity, normal coronary arteries on cath 2021, thoracic aortic ectasia here with chest pain, sent in from office today. Woke up today with left sided chest and shoulder pain, moderate to severe intensity. Not positional or change with use of shoulder. Not pleuritic. Feels like burning. Had similar pain few months back and negative work up at [place]. NO recent exertioal CP/SOB. No fevers/chills/sweats and no cough.     PAST MEDICAL & SURGICAL HISTORY:  Asthma      Bronchitis      COPD (chronic obstructive pulmonary disease)      Mediterranean anemia      Pacemaker      Paroxysmal atrial fibrillation      Aortic aneurysm      Atrial fibrillation      Cardiomyopathy      Hypertension      Ventricular tachycardia      Enlarged prostate      Pericarditis      Crohns disease      Crohn disease      History of appendectomy      History of cholecystectomy      AICD (automatic cardioverter/defibrillator) present        Current Meds  ?? Albuterol Sulfate  (90 Base) MCG/ACT Inhalation Aerosol Solution; inhale 2 puffs  by mouth every 4 to 6 hours if needed  ?? Albuterol-Ipratropium 2.5-0.5 MG/3ML SOLN  ?? ALPRAZolam 0.25 MG Oral Tablet; One tab BID prn anxiety MDD:0.5 TDD:0.5  ?? Atorvastatin Calcium 10 MG Oral Tablet; TAKE 1 TABLET BY MOUTH qod  ?? Breo Ellipta 100-25 MCG/ACT Inhalation Aerosol Powder Breath Activated; 1 PUFF(S)  INHALED ONCE A DAY  ?? Co Q-10 200 MG Oral Capsule  ?? Colchicine 0.6 MG Oral Capsule; TAKE 1 CAPSULE BY MOUTH DAILY  ?? dilTIAZem HCl  MG Oral Capsule Extended Release 24 Hour; TAKE 1 CAPSULE  ONCE DAILY  ?? Eliquis 5 MG Oral Tablet; TAKE 1 TABLET BY MOUTH TWICE DAILY  ?? Florastor CAPS  ?? Fluticasone Furoate-Vilanterol 200-25 MCG/ACT Inhalation Aerosol Powder Breath  Activated; TAKE 1 PUFF BY MOUTH EVERY DAY  ?? Fluticasone-Salmeterol 250-50 MCG/ACT Inhalation Aerosol Powder Breath Activated;  USE ONE INHALATION TWO TIMES A DAY. RINSE MOUTH AFTER USE  ?? hydroCHLOROthiazide 25 MG Oral Tablet; TAKE 1 TABLET BY MOUTH DAILY  ?? Losartan Potassium 50 MG Oral Tablet; TAKE 1 TABLET TWICE DAILY  ?? methylPREDNISolone 4 MG Oral Tablet Therapy Pack; TAKE 4 MG As Directed  ?? methylPREDNISolone TABS  ?? Montelukast Sodium 10 MG Oral Tablet; TAKE 1 TABLET BY MOUTH EVERY DAY  ?? Omega-3-acid Ethyl Esters 1 GM Oral Capsule; take 4 capsules by mouth daily  ?? Omeprazole 40 MG Oral Capsule Delayed Release; TAKE 1 CAPSULE TWICE DAILY  ?? Pentasa 500 MG Oral Capsule Extended Release; TAKE 2 CAPSULES 4 TIMES DAILY  ?? Tamsulosin HCl - 0.4 MG Oral Capsule; one daily as directed  ?? Turmeric TABS             FAMILY HISTORY:  FAMILY HISTORY:  FH: heart disease (Father)        SOCIAL HISTORY: no EtOH, drugs or tobacco    ROS: GI negative All others negative    PHYSCIAL EXAM:  Vital Signs Last 24 Hrs  T(C): 37.2 (27 Dec 2023 10:05), Max: 37.2 (27 Dec 2023 10:05)  T(F): 99 (27 Dec 2023 10:05), Max: 99 (27 Dec 2023 10:05)  HR: 74 (27 Dec 2023 10:05) (74 - 74)  BP: 139/83 (27 Dec 2023 12:36) (139/83 - 159/96)  BP(mean): --  RR: 20 (27 Dec 2023 10:05) (20 - 20)  SpO2: 97% (27 Dec 2023 10:05) (97% - 97%)    Parameters below as of 27 Dec 2023 10:05  Patient On (Oxygen Delivery Method): room air      I&O's Summary    GEN: NAD  HEENT: MMM, sclera anicteric  RESP: CTA bilaterally  CVS: S1S2, RRR, no JVD, no M/R/G  GI: Soft, NT, ND  EXT: no C/C/E  NEURO: AAOX3  PSYCH: Normal affect    LABS:                        12.7   10.44 )-----------( 199      ( 27 Dec 2023 11:30 )             41.4     12-27    142  |  102  |  16.7  ----------------------------<  97  3.9   |  29.0  |  0.75    Ca    10.1      27 Dec 2023 11:30  Mg     2.0     12-27    TPro  7.1  /  Alb  4.5  /  TBili  0.6  /  DBili  x   /  AST  34  /  ALT  45<H>  /  AlkPhos  46  12-27        PT/INR - ( 27 Dec 2023 11:30 )   PT: 12.6 sec;   INR: 1.14 ratio         PTT - ( 27 Dec 2023 11:30 )  PTT:28.4 sec      ECHO 7/2023:  1. Mildly dilated left ventricular cavity size. The left ventricular systolic function is mildly decreased with an ejection fraction visually estimated at 50 to 55 %.  2. Mild left ventricular hypertrophy.  3. The left atrium is moderately dilated.  4. The interatrial septum appears intact.  5. The right atrium is mildly dilated.  6. Moderately enlarged right ventricular cavity size and normal right ventricular systolic function.  7. The aortic root and aortic arch appear normal in size. Mildly dilated proximal ascending aorta.  8. Mild aortic calcification seen in the aortic root.  9. Trace aortic regurgitation.  10. Trace mitral regurgitation.  11. Trace tricuspid regurgitation.  12. Trace pulmonic regurgitation.  13. The inferior vena cava is dilated measuring 2.70 cm in diameter, (dilated >2.1cm) with normal inspiratory collapse (normal >50%) consistent with mildly elevated right atrial pressure (\R\8, range 5-10mmHg).  14. Estimated pulmonary artery systolic pressure is 30 mmHg.  15. No pericardial effusion seen.    ECG: SR, LVH, leftward axis    Assessment:     Patient is a  63y Male with NICM (most recent EF 50-55%) s/p ICD, VT, COPD, obesity, normal coronary arteries on cath 2021, thoracic aortic ectasia here with chest pain, sent in from office today.   -BW unremarkable and ECG as well  -Known normal coronary arteries  -Pain atypical, CTA negative for dissection or acute aortic pathology. ? GI pain       Plan:  1.CV stable for discharge given negative CTA  2. Low suspicion CAD/ischemia  3. Echo in office and follow up with Dr Hector  4. thanks!  Balaji Love MD CHIEF COMPLAINT: chest pain    HPI: Patient is a  63y Male with NICM s/p ICD, VT, COPD, obesity, normal coronary arteries on cath 2021, thoracic aortic ectasia here with chest pain, sent in from office today. Woke up today with left sided chest and shoulder pain, moderate to severe intensity. Not positional or change with use of shoulder. Not pleuritic. Feels like burning. Had similar pain few months back and negative work up at [place]. NO recent exertioal CP/SOB. No fevers/chills/sweats and no cough.     PAST MEDICAL & SURGICAL HISTORY:  Asthma      Bronchitis      COPD (chronic obstructive pulmonary disease)      Mediterranean anemia      Pacemaker      Paroxysmal atrial fibrillation      Aortic aneurysm      Atrial fibrillation      Cardiomyopathy      Hypertension      Ventricular tachycardia      Enlarged prostate      Pericarditis      Crohns disease      Crohn disease      History of appendectomy      History of cholecystectomy      AICD (automatic cardioverter/defibrillator) present        Current Meds  ?? Albuterol Sulfate  (90 Base) MCG/ACT Inhalation Aerosol Solution; inhale 2 puffs  by mouth every 4 to 6 hours if needed  ?? Albuterol-Ipratropium 2.5-0.5 MG/3ML SOLN  ?? ALPRAZolam 0.25 MG Oral Tablet; One tab BID prn anxiety MDD:0.5 TDD:0.5  ?? Atorvastatin Calcium 10 MG Oral Tablet; TAKE 1 TABLET BY MOUTH qod  ?? Breo Ellipta 100-25 MCG/ACT Inhalation Aerosol Powder Breath Activated; 1 PUFF(S)  INHALED ONCE A DAY  ?? Co Q-10 200 MG Oral Capsule  ?? Colchicine 0.6 MG Oral Capsule; TAKE 1 CAPSULE BY MOUTH DAILY  ?? dilTIAZem HCl  MG Oral Capsule Extended Release 24 Hour; TAKE 1 CAPSULE  ONCE DAILY  ?? Eliquis 5 MG Oral Tablet; TAKE 1 TABLET BY MOUTH TWICE DAILY  ?? Florastor CAPS  ?? Fluticasone Furoate-Vilanterol 200-25 MCG/ACT Inhalation Aerosol Powder Breath  Activated; TAKE 1 PUFF BY MOUTH EVERY DAY  ?? Fluticasone-Salmeterol 250-50 MCG/ACT Inhalation Aerosol Powder Breath Activated;  USE ONE INHALATION TWO TIMES A DAY. RINSE MOUTH AFTER USE  ?? hydroCHLOROthiazide 25 MG Oral Tablet; TAKE 1 TABLET BY MOUTH DAILY  ?? Losartan Potassium 50 MG Oral Tablet; TAKE 1 TABLET TWICE DAILY  ?? methylPREDNISolone 4 MG Oral Tablet Therapy Pack; TAKE 4 MG As Directed  ?? methylPREDNISolone TABS  ?? Montelukast Sodium 10 MG Oral Tablet; TAKE 1 TABLET BY MOUTH EVERY DAY  ?? Omega-3-acid Ethyl Esters 1 GM Oral Capsule; take 4 capsules by mouth daily  ?? Omeprazole 40 MG Oral Capsule Delayed Release; TAKE 1 CAPSULE TWICE DAILY  ?? Pentasa 500 MG Oral Capsule Extended Release; TAKE 2 CAPSULES 4 TIMES DAILY  ?? Tamsulosin HCl - 0.4 MG Oral Capsule; one daily as directed  ?? Turmeric TABS             FAMILY HISTORY:  FAMILY HISTORY:  FH: heart disease (Father)        SOCIAL HISTORY: no EtOH, drugs or tobacco    ROS: GI negative All others negative    PHYSCIAL EXAM:  Vital Signs Last 24 Hrs  T(C): 37.2 (27 Dec 2023 10:05), Max: 37.2 (27 Dec 2023 10:05)  T(F): 99 (27 Dec 2023 10:05), Max: 99 (27 Dec 2023 10:05)  HR: 74 (27 Dec 2023 10:05) (74 - 74)  BP: 139/83 (27 Dec 2023 12:36) (139/83 - 159/96)  BP(mean): --  RR: 20 (27 Dec 2023 10:05) (20 - 20)  SpO2: 97% (27 Dec 2023 10:05) (97% - 97%)    Parameters below as of 27 Dec 2023 10:05  Patient On (Oxygen Delivery Method): room air      I&O's Summary    GEN: NAD  HEENT: MMM, sclera anicteric  RESP: CTA bilaterally  CVS: S1S2, RRR, no JVD, no M/R/G  GI: Soft, NT, ND  EXT: no C/C/E  NEURO: AAOX3  PSYCH: Normal affect    LABS:                        12.7   10.44 )-----------( 199      ( 27 Dec 2023 11:30 )             41.4     12-27    142  |  102  |  16.7  ----------------------------<  97  3.9   |  29.0  |  0.75    Ca    10.1      27 Dec 2023 11:30  Mg     2.0     12-27    TPro  7.1  /  Alb  4.5  /  TBili  0.6  /  DBili  x   /  AST  34  /  ALT  45<H>  /  AlkPhos  46  12-27        PT/INR - ( 27 Dec 2023 11:30 )   PT: 12.6 sec;   INR: 1.14 ratio         PTT - ( 27 Dec 2023 11:30 )  PTT:28.4 sec      ECHO 7/2023:  1. Mildly dilated left ventricular cavity size. The left ventricular systolic function is mildly decreased with an ejection fraction visually estimated at 50 to 55 %.  2. Mild left ventricular hypertrophy.  3. The left atrium is moderately dilated.  4. The interatrial septum appears intact.  5. The right atrium is mildly dilated.  6. Moderately enlarged right ventricular cavity size and normal right ventricular systolic function.  7. The aortic root and aortic arch appear normal in size. Mildly dilated proximal ascending aorta.  8. Mild aortic calcification seen in the aortic root.  9. Trace aortic regurgitation.  10. Trace mitral regurgitation.  11. Trace tricuspid regurgitation.  12. Trace pulmonic regurgitation.  13. The inferior vena cava is dilated measuring 2.70 cm in diameter, (dilated >2.1cm) with normal inspiratory collapse (normal >50%) consistent with mildly elevated right atrial pressure (\R\8, range 5-10mmHg).  14. Estimated pulmonary artery systolic pressure is 30 mmHg.  15. No pericardial effusion seen.    ECG: SR, LVH, leftward axis    Assessment:     Patient is a  63y Male with NICM (most recent EF 50-55%) s/p ICD, VT, COPD, obesity, normal coronary arteries on cath 2021, thoracic aortic ectasia here with chest pain, sent in from office today.   -BW unremarkable and ECG as well  -Known normal coronary arteries  -Pain atypical, CTA negative for dissection or acute aortic pathology. ? GI pain       Plan:  1.CV stable for discharge given negative CTA  2. Low suspicion CAD/ischemia  3. Echo in office and follow up with Dr Hector  4. thanks!  Balaji Love MD

## 2023-12-28 ENCOUNTER — APPOINTMENT (OUTPATIENT)
Dept: CARDIOLOGY | Facility: CLINIC | Age: 63
End: 2023-12-28
Payer: MEDICARE

## 2023-12-28 PROCEDURE — 93306 TTE W/DOPPLER COMPLETE: CPT

## 2023-12-28 RX ORDER — COLCHICINE 0.6 MG/1
0.6 TABLET ORAL TWICE DAILY
Qty: 120 | Refills: 3 | Status: ACTIVE | COMMUNITY
Start: 2023-12-28 | End: 1900-01-01

## 2023-12-28 NOTE — REASON FOR VISIT
[FreeTextEntry1] : Mr. Giles presents today following a 15 minutes episode of left-sided sharp retrosternal chest pain radiating to his left shoulder and down his right arm. This occurred a few minutes after arising from bed. Chest pain lasted approximately 15-20 minutes and abated spontaneously. He states he had a similar episode which was less acute on October 22nd. He was seen at [facility] where he underwent chest CT which ruled out pulmonary emboli. There was no evidence of aortic dissection. Mr. Giles states that he does not experience the chest pain with torso flexion or lifting up his arms. Chest pain does not exacerbate with deep inspiration or cough. Patient able to lay flat without issue. He did have COVID-19 infection approximately two weeks ago. I do not believe that this represents an occult pericardial process. EKG today demonstrates normal sinus rhythm at 70 bpm. Borderline LVH voltage. No ischemic changes or suggestion of pericarditis.    cardiac catheterization (2021), as well as a history of symptomatic ventricular arrhythmias for which he is status-post AICD implantation, paroxysmal atrial fibrillation status-post ablation, history of recurrent pericarditis, recent infection with human meta pneurovirus, and lastly, reactive airway disease/asthma who presents for evaluation.

## 2023-12-28 NOTE — ASSESSMENT
[FreeTextEntry1] : 1. As stated above, EKG today demonstrates normal sinus rhythm at 70 bpm without acute changes.   2. Chest pain: Patient's chest pain is sharp and radiating to the left shoulder and arm. Had a similar but less significant episode on October 22nd. Patient has a history of dilatation of his ascending aorta on echocardiography which measures 4.2 cm as of July. Patient had cardiac catheterization in 2021 and was found to have normal coronary arteries. At this point, I am recommending that he go to the emergency room at [facility] for a repeat chest CTA to rule out dissection.

## 2024-01-12 ENCOUNTER — APPOINTMENT (OUTPATIENT)
Dept: CARDIOLOGY | Facility: CLINIC | Age: 64
End: 2024-01-12

## 2024-05-15 ENCOUNTER — APPOINTMENT (OUTPATIENT)
Dept: CARDIOLOGY | Facility: CLINIC | Age: 64
End: 2024-05-15

## 2024-05-15 VITALS
HEIGHT: 72 IN | WEIGHT: 225 LBS | BODY MASS INDEX: 30.48 KG/M2 | SYSTOLIC BLOOD PRESSURE: 126 MMHG | DIASTOLIC BLOOD PRESSURE: 88 MMHG | RESPIRATION RATE: 16 BRPM | HEART RATE: 81 BPM

## 2024-05-15 PROCEDURE — 93000 ELECTROCARDIOGRAM COMPLETE: CPT

## 2024-05-15 PROCEDURE — G2211 COMPLEX E/M VISIT ADD ON: CPT

## 2024-05-15 PROCEDURE — 99214 OFFICE O/P EST MOD 30 MIN: CPT

## 2024-06-06 ENCOUNTER — RX RENEWAL (OUTPATIENT)
Age: 64
End: 2024-06-06

## 2024-06-06 ENCOUNTER — APPOINTMENT (OUTPATIENT)
Dept: PULMONOLOGY | Facility: CLINIC | Age: 64
End: 2024-06-06
Payer: MEDICARE

## 2024-06-06 VITALS — BODY MASS INDEX: 31.15 KG/M2 | HEIGHT: 72 IN | WEIGHT: 230 LBS

## 2024-06-06 VITALS
OXYGEN SATURATION: 98 % | DIASTOLIC BLOOD PRESSURE: 80 MMHG | RESPIRATION RATE: 16 BRPM | SYSTOLIC BLOOD PRESSURE: 128 MMHG | HEART RATE: 78 BPM

## 2024-06-06 DIAGNOSIS — J90 PLEURAL EFFUSION, NOT ELSEWHERE CLASSIFIED: ICD-10-CM

## 2024-06-06 DIAGNOSIS — E66.3 OVERWEIGHT: ICD-10-CM

## 2024-06-06 DIAGNOSIS — R06.02 SHORTNESS OF BREATH: ICD-10-CM

## 2024-06-06 DIAGNOSIS — J98.11 ATELECTASIS: ICD-10-CM

## 2024-06-06 DIAGNOSIS — J45.909 UNSPECIFIED ASTHMA, UNCOMPLICATED: ICD-10-CM

## 2024-06-06 DIAGNOSIS — I48.0 PAROXYSMAL ATRIAL FIBRILLATION: ICD-10-CM

## 2024-06-06 DIAGNOSIS — G47.33 OBSTRUCTIVE SLEEP APNEA (ADULT) (PEDIATRIC): ICD-10-CM

## 2024-06-06 DIAGNOSIS — Z87.891 PERSONAL HISTORY OF NICOTINE DEPENDENCE: ICD-10-CM

## 2024-06-06 PROCEDURE — 99214 OFFICE O/P EST MOD 30 MIN: CPT | Mod: 25

## 2024-06-06 PROCEDURE — 94010 BREATHING CAPACITY TEST: CPT

## 2024-06-06 RX ORDER — DOXYCLYCLINE HYCLATE 150 MG/1
TABLET, COATED ORAL
Refills: 0 | Status: ACTIVE | COMMUNITY

## 2024-06-06 RX ORDER — METHYLPREDNISOLONE 4 MG/1
TABLET ORAL
Refills: 0 | Status: ACTIVE | COMMUNITY

## 2024-06-06 RX ORDER — PREDNISONE 5 MG/1
5 TABLET ORAL 3 TIMES DAILY
Qty: 270 | Refills: 0 | Status: DISCONTINUED | COMMUNITY
Start: 2023-12-28 | End: 2024-06-06

## 2024-06-06 RX ORDER — FLUTICASONE FUROATE AND VILANTEROL TRIFENATATE 100; 25 UG/1; UG/1
100-25 POWDER RESPIRATORY (INHALATION)
Qty: 3 | Refills: 3 | Status: ACTIVE | COMMUNITY
Start: 2022-08-09 | End: 1900-01-01

## 2024-06-06 RX ORDER — FLUTICASONE PROPIONATE AND SALMETEROL 250; 50 UG/1; UG/1
250-50 POWDER RESPIRATORY (INHALATION)
Qty: 3 | Refills: 0 | Status: DISCONTINUED | COMMUNITY
End: 2024-06-06

## 2024-06-06 NOTE — HISTORY OF PRESENT ILLNESS
[TextBox_4] : He is a former smoker of 1 ppd x 4 years, quit 1984. ? h/o asthma vs COPD or reactive airways disease for which he has been maintained on Symbicort/Breo and on Montelukast therapy.  He is followed with cardiology for reported nonischemic CM though now with normal EF, s/p /PPMAICD for reported ventricular arrhythmia, and h/o pericarditis for which he is on Colchicine. He also has thoracic aortic aneurysm. Pt previously with increased SOB and was seen by his PCP/pulm, cardiology and admitted to [facility] from 8/30/21-9/3/21 but w/u including CXR and CTA were unremarkable. He was d/c'd as a COPD exacerbation. Pt is now doing well. Was on Breo 200 and now on Breo 100. Pt did not do well on Arnuity 100. Has PAF on Eliquis. [Mild Persistent] : mild persistent [Dyspnea on Exertion] : dyspnea on exertion [Inhaled Short-Acting Beta-2 Agonists] : inhaled short-acting beta-2 agonists [Inhaled Long-Acting Beta-2 Agonists] : inhaled long-acting beta-2 agonists [Inhaled Corticosteroids] : inhaled corticosteroids [Leukotriene Modifiers] : leukotriene modifiers [de-identified] : AutoCPAP [On ___] : performed on [unfilled] [Patient] : the patient [Indication ___] : for an indication of [unfilled] [None] : no new symptoms reported [FreeTextEntry9] : Chest CT [FreeTextEntry8] : Small b/l effusions with atelectasis and ? infiltrate/consolidation [Follow-Up - Routine Clinic] : a routine clinic follow-up of [Excess Weight] : excess weight [Currently Experiencing] : The patient is currently experiencing symptoms. [Dyspnea] : dyspnea [Low Calorie Diet] : low calorie diet [Good Compliance] : good compliance with treatment [Good Tolerance] : good tolerance of treatment [Good Symptom Control] : good symptom control [Sleep Apnea] : sleep apnea [High] : high [Low Calorie] : low calorie [Well Balanced Diet] : well balanced meals [Infrequently] : exercises infrequently [Walking] : walking [TextEntry] : Chest CT from 7/24/23 revealed resolution of prior atelectasis/consolidations and effusions.

## 2024-06-06 NOTE — CONSULT LETTER
[Dear  ___] : Dear  [unfilled], [Consult Letter:] : I had the pleasure of evaluating your patient, [unfilled]. [Please see my note below.] : Please see my note below. [Consult Closing:] : Thank you very much for allowing me to participate in the care of this patient.  If you have any questions, please do not hesitate to contact me. [Sincerely,] : Sincerely, [FreeTextEntry3] : Romain Vega MD, FCCP, D. ABSM\par  Pulmonary and Sleep Medicine\par  [facility] at [place]  [DrIvan  ___] : Dr. HARRIS

## 2024-06-06 NOTE — DISCUSSION/SUMMARY
[FreeTextEntry1] : #1. PFTs performed previously were essentially normal with a mildly reduced FVC and TLC but then normal PFTs with 30 lb weight loss and Breo 200. He had normal spirometry on Breo 100 so pt was to decrease further to Arnuity 100 but did not so so but rather was on Breztri after URI. He is now doing well on Breo 100 and did not do well on Arnuity. Pt also on Montelukast 10 mg nightly for seasonal allergies. #2. The patient may not require chronic BD therapy but has been on Breo 200 though given normal lung function so decreased to Breo 100 without deterioration; continue to decrease therapy as tolerates. Consider Arnuity if tolerates. #3. SOBOE is likely related to weight or deconditioning given normal PFTs. #4. Diet and exercise for weight loss. #5. Continue autoCPAP to treat severe ABBIE with an AHI of 42.1; encouraged compliance; The patient is using and benefitting from CPAP therapy. Encouraged improved compliance. #6. Replace PAP equipment as needed; ordered 6/6/24. #7. Prior lung imaging studies were unremarkable but had CT in 4/2022 which revealed effusions and consolidations which appear resolved on last study from 7/24/23. No further imaging studies are now needed. #8. Cardiology f/u to optimize cardiac function.  #9. Pt had both Covid vaccines and booster; s/p flu vaccine. #10. F/u 6 months pror to going to [place] with compliance.  The patient expressed understanding and agreement with the above recommendations/plan and accepts responsibility to be compliant with recommended testing, therapies, and f/u visits. All relevant questions and concerns were addressed.

## 2024-06-06 NOTE — PROCEDURE
[FreeTextEntry1] : PFTs 10/6/21 - Mildly reduced FVC and normal FEV1 without obstruction with mildly reduced lung volumes and normal diffusion capacity. PFTs 8/9/22 - normal on Breo 200. Spirometry 11/1/22 - normal on Breo 100. Chuck on 11/17/23 - normal with higher numbers than prior on his current Breztri.  Chuck 12/14/23 - normal on Breo 100 though slightly reduced c/w prior on Breztri. Chuck 6/6/24 - normal on Breo 100.

## 2024-07-18 ENCOUNTER — NON-APPOINTMENT (OUTPATIENT)
Age: 64
End: 2024-07-18

## 2024-07-19 ENCOUNTER — NON-APPOINTMENT (OUTPATIENT)
Age: 64
End: 2024-07-19

## 2024-07-19 ENCOUNTER — APPOINTMENT (OUTPATIENT)
Dept: OPHTHALMOLOGY | Facility: CLINIC | Age: 64
End: 2024-07-19
Payer: MEDICARE

## 2024-07-19 PROCEDURE — 92014 COMPRE OPH EXAM EST PT 1/>: CPT

## 2024-07-24 ENCOUNTER — RX RENEWAL (OUTPATIENT)
Age: 64
End: 2024-07-24

## 2024-07-24 RX ORDER — DILTIAZEM HYDROCHLORIDE 120 MG/1
120 CAPSULE, EXTENDED RELEASE ORAL
Qty: 90 | Refills: 3 | Status: ACTIVE | COMMUNITY
Start: 2024-07-24 | End: 1900-01-01

## 2024-10-29 ENCOUNTER — APPOINTMENT (OUTPATIENT)
Dept: CARDIOLOGY | Facility: CLINIC | Age: 64
End: 2024-10-29
Payer: MEDICARE

## 2024-10-29 PROCEDURE — 93306 TTE W/DOPPLER COMPLETE: CPT

## 2024-10-30 ENCOUNTER — RX RENEWAL (OUTPATIENT)
Age: 64
End: 2024-10-30

## 2024-11-19 ENCOUNTER — APPOINTMENT (OUTPATIENT)
Dept: CARDIOLOGY | Facility: CLINIC | Age: 64
End: 2024-11-19
Payer: MEDICARE

## 2024-11-19 VITALS
HEIGHT: 74 IN | HEART RATE: 75 BPM | DIASTOLIC BLOOD PRESSURE: 80 MMHG | OXYGEN SATURATION: 96 % | SYSTOLIC BLOOD PRESSURE: 140 MMHG | WEIGHT: 228 LBS | BODY MASS INDEX: 29.26 KG/M2

## 2024-11-19 DIAGNOSIS — E78.00 PURE HYPERCHOLESTEROLEMIA, UNSPECIFIED: ICD-10-CM

## 2024-11-19 DIAGNOSIS — Z95.810 PRESENCE OF AUTOMATIC (IMPLANTABLE) CARDIAC DEFIBRILLATOR: ICD-10-CM

## 2024-11-19 DIAGNOSIS — I10 ESSENTIAL (PRIMARY) HYPERTENSION: ICD-10-CM

## 2024-11-19 DIAGNOSIS — I42.0 DILATED CARDIOMYOPATHY: ICD-10-CM

## 2024-11-19 DIAGNOSIS — J45.909 UNSPECIFIED ASTHMA, UNCOMPLICATED: ICD-10-CM

## 2024-11-19 DIAGNOSIS — I77.810 THORACIC AORTIC ECTASIA: ICD-10-CM

## 2024-11-19 DIAGNOSIS — I48.0 PAROXYSMAL ATRIAL FIBRILLATION: ICD-10-CM

## 2024-11-19 DIAGNOSIS — G47.33 OBSTRUCTIVE SLEEP APNEA (ADULT) (PEDIATRIC): ICD-10-CM

## 2024-11-19 PROCEDURE — 93000 ELECTROCARDIOGRAM COMPLETE: CPT

## 2024-11-19 PROCEDURE — G2211 COMPLEX E/M VISIT ADD ON: CPT

## 2024-11-19 PROCEDURE — 99214 OFFICE O/P EST MOD 30 MIN: CPT

## 2024-12-03 ENCOUNTER — APPOINTMENT (OUTPATIENT)
Dept: PULMONOLOGY | Facility: CLINIC | Age: 64
End: 2024-12-03
Payer: MEDICARE

## 2024-12-03 VITALS
BODY MASS INDEX: 28.88 KG/M2 | HEART RATE: 67 BPM | DIASTOLIC BLOOD PRESSURE: 69 MMHG | OXYGEN SATURATION: 96 % | WEIGHT: 225 LBS | RESPIRATION RATE: 16 BRPM | HEIGHT: 74 IN | SYSTOLIC BLOOD PRESSURE: 124 MMHG

## 2024-12-03 DIAGNOSIS — R06.02 SHORTNESS OF BREATH: ICD-10-CM

## 2024-12-03 DIAGNOSIS — J45.909 UNSPECIFIED ASTHMA, UNCOMPLICATED: ICD-10-CM

## 2024-12-03 DIAGNOSIS — J98.11 ATELECTASIS: ICD-10-CM

## 2024-12-03 DIAGNOSIS — G47.33 OBSTRUCTIVE SLEEP APNEA (ADULT) (PEDIATRIC): ICD-10-CM

## 2024-12-03 DIAGNOSIS — Z87.891 PERSONAL HISTORY OF NICOTINE DEPENDENCE: ICD-10-CM

## 2024-12-03 DIAGNOSIS — J90 PLEURAL EFFUSION, NOT ELSEWHERE CLASSIFIED: ICD-10-CM

## 2024-12-03 DIAGNOSIS — E66.3 OVERWEIGHT: ICD-10-CM

## 2024-12-03 PROCEDURE — 99214 OFFICE O/P EST MOD 30 MIN: CPT

## 2024-12-03 PROCEDURE — G2211 COMPLEX E/M VISIT ADD ON: CPT

## 2024-12-18 ENCOUNTER — APPOINTMENT (OUTPATIENT)
Dept: CT IMAGING | Facility: CLINIC | Age: 64
End: 2024-12-18
Payer: MEDICARE

## 2024-12-18 ENCOUNTER — OUTPATIENT (OUTPATIENT)
Dept: OUTPATIENT SERVICES | Facility: HOSPITAL | Age: 64
LOS: 1 days | End: 2024-12-18
Payer: MEDICARE

## 2024-12-18 DIAGNOSIS — R07.89 OTHER CHEST PAIN: ICD-10-CM

## 2024-12-18 DIAGNOSIS — Z95.810 PRESENCE OF AUTOMATIC (IMPLANTABLE) CARDIAC DEFIBRILLATOR: Chronic | ICD-10-CM

## 2024-12-18 DIAGNOSIS — I42.0 DILATED CARDIOMYOPATHY: ICD-10-CM

## 2024-12-18 DIAGNOSIS — Z90.49 ACQUIRED ABSENCE OF OTHER SPECIFIED PARTS OF DIGESTIVE TRACT: Chronic | ICD-10-CM

## 2024-12-18 DIAGNOSIS — I77.810 THORACIC AORTIC ECTASIA: ICD-10-CM

## 2024-12-18 PROCEDURE — 75574 CT ANGIO HRT W/3D IMAGE: CPT | Mod: 26,MH

## 2024-12-18 PROCEDURE — 75574 CT ANGIO HRT W/3D IMAGE: CPT

## 2025-04-29 ENCOUNTER — NON-APPOINTMENT (OUTPATIENT)
Age: 65
End: 2025-04-29

## 2025-04-30 NOTE — ASU PATIENT PROFILE, ADULT - NSSUBSTANCEUSE_GEN_ALL_CORE_SD
I recommend:     Follow up next week for injection to the left shoulder     Please reach out with any questions or concerns prior   
caffeine

## 2025-05-01 ENCOUNTER — APPOINTMENT (OUTPATIENT)
Dept: CARDIOLOGY | Facility: CLINIC | Age: 65
End: 2025-05-01
Payer: MEDICARE

## 2025-05-01 VITALS
RESPIRATION RATE: 16 BRPM | OXYGEN SATURATION: 98 % | WEIGHT: 230 LBS | HEART RATE: 78 BPM | DIASTOLIC BLOOD PRESSURE: 80 MMHG | HEIGHT: 74 IN | BODY MASS INDEX: 29.52 KG/M2 | SYSTOLIC BLOOD PRESSURE: 152 MMHG

## 2025-05-01 VITALS — SYSTOLIC BLOOD PRESSURE: 140 MMHG | DIASTOLIC BLOOD PRESSURE: 80 MMHG

## 2025-05-01 DIAGNOSIS — I77.810 THORACIC AORTIC ECTASIA: ICD-10-CM

## 2025-05-01 DIAGNOSIS — E78.00 PURE HYPERCHOLESTEROLEMIA, UNSPECIFIED: ICD-10-CM

## 2025-05-01 DIAGNOSIS — I10 ESSENTIAL (PRIMARY) HYPERTENSION: ICD-10-CM

## 2025-05-01 DIAGNOSIS — I42.0 DILATED CARDIOMYOPATHY: ICD-10-CM

## 2025-05-01 DIAGNOSIS — I48.0 PAROXYSMAL ATRIAL FIBRILLATION: ICD-10-CM

## 2025-05-01 DIAGNOSIS — Z95.810 PRESENCE OF AUTOMATIC (IMPLANTABLE) CARDIAC DEFIBRILLATOR: ICD-10-CM

## 2025-05-01 PROCEDURE — G2211 COMPLEX E/M VISIT ADD ON: CPT

## 2025-05-01 PROCEDURE — 93000 ELECTROCARDIOGRAM COMPLETE: CPT

## 2025-05-01 PROCEDURE — 99214 OFFICE O/P EST MOD 30 MIN: CPT

## 2025-07-22 ENCOUNTER — NON-APPOINTMENT (OUTPATIENT)
Age: 65
End: 2025-07-22

## 2025-07-23 ENCOUNTER — APPOINTMENT (OUTPATIENT)
Dept: CARDIOLOGY | Facility: CLINIC | Age: 65
End: 2025-07-23
Payer: MEDICARE

## 2025-07-23 VITALS
HEIGHT: 74 IN | RESPIRATION RATE: 16 BRPM | SYSTOLIC BLOOD PRESSURE: 130 MMHG | BODY MASS INDEX: 29.52 KG/M2 | HEART RATE: 61 BPM | WEIGHT: 230 LBS | DIASTOLIC BLOOD PRESSURE: 80 MMHG

## 2025-07-23 DIAGNOSIS — I48.0 PAROXYSMAL ATRIAL FIBRILLATION: ICD-10-CM

## 2025-07-23 DIAGNOSIS — I42.0 DILATED CARDIOMYOPATHY: ICD-10-CM

## 2025-07-23 DIAGNOSIS — Z95.810 PRESENCE OF AUTOMATIC (IMPLANTABLE) CARDIAC DEFIBRILLATOR: ICD-10-CM

## 2025-07-23 DIAGNOSIS — I77.810 THORACIC AORTIC ECTASIA: ICD-10-CM

## 2025-07-23 DIAGNOSIS — E78.00 PURE HYPERCHOLESTEROLEMIA, UNSPECIFIED: ICD-10-CM

## 2025-07-23 DIAGNOSIS — I10 ESSENTIAL (PRIMARY) HYPERTENSION: ICD-10-CM

## 2025-07-23 PROCEDURE — G2211 COMPLEX E/M VISIT ADD ON: CPT

## 2025-07-23 PROCEDURE — 99214 OFFICE O/P EST MOD 30 MIN: CPT

## 2025-07-23 PROCEDURE — 93000 ELECTROCARDIOGRAM COMPLETE: CPT

## 2025-08-28 ENCOUNTER — NON-APPOINTMENT (OUTPATIENT)
Age: 65
End: 2025-08-28

## 2025-08-28 DIAGNOSIS — R19.8 OTHER SPECIFIED SYMPTOMS AND SIGNS INVOLVING THE DIGESTIVE SYSTEM AND ABDOMEN: ICD-10-CM

## 2025-09-04 ENCOUNTER — APPOINTMENT (OUTPATIENT)
Dept: CARDIOLOGY | Facility: CLINIC | Age: 65
End: 2025-09-04

## 2025-09-04 PROCEDURE — 93306 TTE W/DOPPLER COMPLETE: CPT

## 2025-09-09 ENCOUNTER — RX RENEWAL (OUTPATIENT)
Age: 65
End: 2025-09-09

## 2025-09-09 LAB — A1CG - A1C WITH ESTIMATED AVERAGE GLUCOSE: 5.7

## 2025-09-15 ENCOUNTER — RX RENEWAL (OUTPATIENT)
Age: 65
End: 2025-09-15

## 2025-09-16 ENCOUNTER — NON-APPOINTMENT (OUTPATIENT)
Age: 65
End: 2025-09-16

## 2025-09-18 ENCOUNTER — APPOINTMENT (OUTPATIENT)
Dept: CARDIOLOGY | Facility: CLINIC | Age: 65
End: 2025-09-18
Payer: MEDICARE

## 2025-09-18 ENCOUNTER — RX RENEWAL (OUTPATIENT)
Age: 65
End: 2025-09-18

## 2025-09-18 VITALS
OXYGEN SATURATION: 94 % | HEART RATE: 81 BPM | WEIGHT: 315 LBS | DIASTOLIC BLOOD PRESSURE: 80 MMHG | BODY MASS INDEX: 40.43 KG/M2 | RESPIRATION RATE: 16 BRPM | SYSTOLIC BLOOD PRESSURE: 118 MMHG | HEIGHT: 74 IN

## 2025-09-18 DIAGNOSIS — D56.9 THALASSEMIA, UNSPECIFIED: ICD-10-CM

## 2025-09-18 DIAGNOSIS — I42.0 DILATED CARDIOMYOPATHY: ICD-10-CM

## 2025-09-18 DIAGNOSIS — Z95.810 PRESENCE OF AUTOMATIC (IMPLANTABLE) CARDIAC DEFIBRILLATOR: ICD-10-CM

## 2025-09-18 DIAGNOSIS — I31.9 DISEASE OF PERICARDIUM, UNSPECIFIED: ICD-10-CM

## 2025-09-18 DIAGNOSIS — J45.909 UNSPECIFIED ASTHMA, UNCOMPLICATED: ICD-10-CM

## 2025-09-18 DIAGNOSIS — I48.0 PAROXYSMAL ATRIAL FIBRILLATION: ICD-10-CM

## 2025-09-18 DIAGNOSIS — I77.810 THORACIC AORTIC ECTASIA: ICD-10-CM

## 2025-09-18 DIAGNOSIS — E78.00 PURE HYPERCHOLESTEROLEMIA, UNSPECIFIED: ICD-10-CM

## 2025-09-18 DIAGNOSIS — G47.33 OBSTRUCTIVE SLEEP APNEA (ADULT) (PEDIATRIC): ICD-10-CM

## 2025-09-18 DIAGNOSIS — I10 ESSENTIAL (PRIMARY) HYPERTENSION: ICD-10-CM

## 2025-09-18 PROCEDURE — G2211 COMPLEX E/M VISIT ADD ON: CPT

## 2025-09-18 PROCEDURE — 93000 ELECTROCARDIOGRAM COMPLETE: CPT

## 2025-09-18 PROCEDURE — 99214 OFFICE O/P EST MOD 30 MIN: CPT
